# Patient Record
Sex: MALE | Race: BLACK OR AFRICAN AMERICAN | NOT HISPANIC OR LATINO | Employment: OTHER | URBAN - METROPOLITAN AREA
[De-identification: names, ages, dates, MRNs, and addresses within clinical notes are randomized per-mention and may not be internally consistent; named-entity substitution may affect disease eponyms.]

---

## 2017-03-10 ENCOUNTER — HOSPITAL ENCOUNTER (OUTPATIENT)
Dept: PAIN MEDICINE | Facility: HOSPITAL | Age: 47
Discharge: HOME | End: 2017-03-10
Attending: ANESTHESIOLOGY | Admitting: ANESTHESIOLOGY

## 2017-03-10 LAB
AMPHETAMINES UR QL SCN: NEGATIVE
BZE UR QL SCN: NEGATIVE
CREAT 24H UR-MCNC: NORMAL MG/DL
METHADONE UR QL SCN: NEGATIVE
OPIATE CONFIRMATION URINE: NORMAL
THC SERPLBLD CFM-MCNC: NEGATIVE NG/ML

## 2017-05-19 ENCOUNTER — HOSPITAL ENCOUNTER (OUTPATIENT)
Dept: PAIN MEDICINE | Facility: HOSPITAL | Age: 47
Discharge: HOME | End: 2017-05-19
Attending: ANESTHESIOLOGY | Admitting: ANESTHESIOLOGY

## 2017-07-12 ENCOUNTER — HOSPITAL ENCOUNTER (OUTPATIENT)
Dept: PAIN MEDICINE | Facility: HOSPITAL | Age: 47
Discharge: HOME | End: 2017-07-12
Attending: ANESTHESIOLOGY | Admitting: ANESTHESIOLOGY

## 2017-09-13 ENCOUNTER — HOSPITAL ENCOUNTER (OUTPATIENT)
Dept: PAIN MEDICINE | Facility: HOSPITAL | Age: 47
Discharge: HOME | End: 2017-09-13
Attending: ANESTHESIOLOGY | Admitting: ANESTHESIOLOGY

## 2018-02-28 ENCOUNTER — HOSPITAL ENCOUNTER (OUTPATIENT)
Dept: PAIN MEDICINE | Facility: HOSPITAL | Age: 48
Discharge: HOME | End: 2018-02-28
Attending: ANESTHESIOLOGY | Admitting: ANESTHESIOLOGY

## 2018-04-25 ENCOUNTER — HOSPITAL ENCOUNTER (OUTPATIENT)
Dept: PAIN MEDICINE | Facility: HOSPITAL | Age: 48
Discharge: HOME | End: 2018-04-25
Attending: ANESTHESIOLOGY | Admitting: ANESTHESIOLOGY

## 2018-06-27 ENCOUNTER — HOSPITAL ENCOUNTER (OUTPATIENT)
Dept: PAIN MEDICINE | Facility: HOSPITAL | Age: 48
Discharge: HOME | End: 2018-06-27
Attending: ANESTHESIOLOGY | Admitting: ANESTHESIOLOGY

## 2018-08-15 ENCOUNTER — HOSPITAL ENCOUNTER (OUTPATIENT)
Dept: PAIN MEDICINE | Facility: HOSPITAL | Age: 48
Discharge: HOME | End: 2018-08-15
Attending: ANESTHESIOLOGY | Admitting: ANESTHESIOLOGY

## 2018-08-15 LAB
AMPHETAMINES UR QL SCN: NEGATIVE
BARBITURATES UR QL SCN: NEGATIVE
BENZODIAZ UR QL SCN: NEGATIVE
BZE UR QL SCN: NEGATIVE
CREAT 24H UR-MCNC: NORMAL MG/DL
METHADONE UR QL SCN: NEGATIVE
OPIATE CONFIRMATION URINE: NORMAL
OPIATES TESTED UR SCN: NEGATIVE
PCP UR QL: NEGATIVE
THC SERPLBLD CFM-MCNC: NEGATIVE NG/ML

## 2018-10-10 ENCOUNTER — HOSPITAL ENCOUNTER (OUTPATIENT)
Dept: PAIN MEDICINE | Facility: HOSPITAL | Age: 48
Discharge: HOME | End: 2018-10-10
Attending: ANESTHESIOLOGY | Admitting: ANESTHESIOLOGY

## 2018-12-05 ENCOUNTER — HOSPITAL ENCOUNTER (OUTPATIENT)
Dept: PAIN MEDICINE | Facility: HOSPITAL | Age: 48
Discharge: HOME | End: 2018-12-05
Attending: ANESTHESIOLOGY | Admitting: ANESTHESIOLOGY

## 2018-12-05 LAB
AMPHETAMINES UR QL SCN: NEGATIVE
BARBITURATES UR QL SCN: NEGATIVE
BENZODIAZ UR QL SCN: NEGATIVE
BZE UR QL SCN: NEGATIVE
CREAT 24H UR-MCNC: 87.8 MG/DL
METHADONE UR QL SCN: NEGATIVE
OPIATE CONFIRMATION URINE: NORMAL
OPIATES TESTED UR SCN: NEGATIVE
PCP UR QL: NEGATIVE
THC SERPLBLD CFM-MCNC: NEGATIVE NG/ML

## 2019-01-30 ENCOUNTER — HOSPITAL ENCOUNTER (OUTPATIENT)
Dept: PAIN MEDICINE | Facility: HOSPITAL | Age: 49
Discharge: HOME | End: 2019-01-30
Attending: ANESTHESIOLOGY | Admitting: ANESTHESIOLOGY

## 2019-04-03 ENCOUNTER — HOSPITAL ENCOUNTER (OUTPATIENT)
Dept: PAIN MEDICINE | Facility: HOSPITAL | Age: 49
Discharge: HOME | End: 2019-04-03
Attending: ANESTHESIOLOGY | Admitting: ANESTHESIOLOGY

## 2019-06-03 ENCOUNTER — HOSPITAL ENCOUNTER (OUTPATIENT)
Dept: PAIN MEDICINE | Facility: HOSPITAL | Age: 49
Discharge: HOME | End: 2019-06-03
Attending: ANESTHESIOLOGY | Admitting: ANESTHESIOLOGY

## 2019-06-03 ENCOUNTER — CONVERSION ENCOUNTER (OUTPATIENT)
Dept: PAIN MEDICINE | Facility: CLINIC | Age: 49
End: 2019-06-03

## 2019-06-05 VITALS
DIASTOLIC BLOOD PRESSURE: 93 MMHG | HEIGHT: 74 IN | WEIGHT: 220 LBS | RESPIRATION RATE: 16 BRPM | BODY MASS INDEX: 28.23 KG/M2 | SYSTOLIC BLOOD PRESSURE: 174 MMHG | OXYGEN SATURATION: 97 % | HEART RATE: 82 BPM

## 2019-06-06 NOTE — PROGRESS NOTES
HPI: CC Bilateral feet/joint pain    48-year-old male with multiple comorbidities including DM, right BKA/left toes amputations,  chronic neuropathic pain, polyarthralgia here for follow-up.  Usually sees Dr. English.    Complains of continued generalized myofascial pain, peripheral neuropathy symptoms, back pain bilateral hip pain and polyarthralgia worse with activity.      Pain interfering daily activity.      Utilizes   Oxycodone with good relief functional benefit denies side effects.   could not tolerate gabapentin or Lyrica.    Referring MD: Eusebia Poon NP  Age: 48 Years Old  Sex: Male  Race: Black or     Pain Assessment   Location of Pain: R Wrist/Arm, L Wrist/Arm, R Hand, L Hand, R Hip, L Hip, R Leg, L Leg, R Knee, L Knee, R Ankle/Foot, L Ankle/Foot  Description of Pain: Sharp, Dull/Aching, Throbbing, Pins & Needles, Stabbing  Previous Pain Rating : 7  Current Pain Ratin  Aggravating Factors: Activity  Alleviating Factors: Rest, Medication  Previous Treatments: Narcotic Pain Medication, Physical Therapy  Last Dose Pain medicine Oxycodone last dose:  6-19 at 0740  Have your bowel habits changed since you started taking pain medication? No  Epidural History N/A      Past Medical History:     Reviewed history from 2016 and no changes required:        Diabetes        Neuropathy        Hypertension        hip pain        arthritis        CHF        leg edema        No Drug Allergies?     Past Surgical History:     Reviewed history from 08/15/2018 and no changes required:        Left great toe amputation        Cataract surgery        diabetic ulcers        Right foot surgery on a diabetic ulcer.         right BKA 2017        abscess removal    Family History Summary:      Reviewed history Last on 2019 and no changes required:2019  Brother - Has Family History of Diabetes - Entered On: 8/15/2018  Brother - Has Family History of Other Cancer - Entered On:  8/15/2018  Father - Has Family History of Heart Disease - Entered On: 8/15/2018  Mother - Has Family History of Diabetes - Entered On: 8/15/2018      Social History:     Reviewed history and no changes required:        Vital Signs:    Patient Profile:    48 Years Old Male  CC:         Bilateral feet/joint pain  Height:     74 inches  Weight:     220 pounds  BMI:        28.24     O2 Sat:     97 %  Temp:       97.6 degrees F oral  Pulse rate: 82 / minute  Resp:       16 per minute  BP Sittin / 93    Patient has a risk of falls? No  Patient in pain?    Yes     Location:    R Wrist/Arm, L Wrist/Arm, R Hand, L Hand, R Hip, L Hip, R Leg, L Leg, R Knee, L Knee, R Ankle/Foot, L Ankle/Foot     Intensity:   7    Problems: Active problems were reviewed with the patient during this visit.  Medications: Medications were reviewed with the patient during this visit.  Allergies: Allergies were reviewed with the patient during this visit.          Risk Factors:     Smoked Tobacco Use:  Current every day smoker     Cigarettes:  Yes -- 0.5 pack(s) per day,      Years smoked:  25    Previous Tobacco Use: Signed On - 2019  Smoked Tobacco Use:  Current every day smoker     Cigarettes:  Yes -- 1/2 pack(s) per day,      Years smoked:  25     Counseled to quit/cut down:  yes      Review of Systems        See HPI    General       Denies fever/chills, fatigue and sleep problems.    Eyes       Denies blurry vision and double vision.    ENT       Denies decreased hearing, sore throat and ears ringing.    CV       Denies chest pain and fainting.    Resp       Denies shortness of breath and cough.    GI       Denies heartburn, constipation, nausea, vomiting and diarrhea.           Denies pain on urination, incontinence and increased frequency.    MS         Back pain, shaheen leg pain. shaheen hand pain  Derm       Denies rash and itching.    Neuro       Denies numbness, tingling, loss of balance and history of seizures.    Psych        Denies anxiety and depression.    Endo       Denies weight change and thirsty all the time.    Heme       Denies easy bruising, bleeding and enlarged lymph nodes.    Physical Exam   General  General appearance: well developed, well nourished, no acute distress  Gait and station: antalgic    Mental Status Exam   Mental Status: AAO x3  Behavior: Appropriate    Cervical   ROM decreased w/ lateral rotation  Spurling's Test Negative  Palpation: Non-Tender    Thoracolumbar   ROM: decreased rotation/extension  Lg's Test: Negative  Straight Leg Raise: negative  Palpation: Tender  Facets, Paravertebral    Muscle Stretch Reflex   Sensory Decreased sensation to light touch    EXAM:     Eyes:      Clear conjunctivae,      Pupils rounds and reactive  ENT:      Clear oropharynx,       Mucosa moist/pink       Nose: no deformity  Chest Wall:      Non tender      No deformities or masses noted.    Respiratory:      Clear bilaterally to auscultation.        No dyspnea  Heart:      Regular rate and rhythm, no murmurs, rubs, or gallops   Pulses:      Pulses 2+, symmetric  Extremities:      No clubbing, cyanosis, edema, or deformity noted      R BKA with prosthesis     L all toes amputaion  Neurologic:      No focal deficits      Coordination intact with rapid alternating movement.  Skin:      Intact without lesions or rashes.  No mass  Cervical Nodes:      No adenopathy noted.      Global pain scale and PHQ-9 on chart   opioid risk tool low risk    Neuro   Reflexes: R Arm 2+  L arm 2+  R Leg 2+  L Leg 2+    Strength: Arms Normal  Strength: Legs Normal          EXAM:     Total Score Risk Category   Low Risk 0 - 3   Moderate Risk 4 - 7   High Risk > 8     Assessment   New Problems:  Myofascial pain syndrome (ICD-729.1) (TVC04-U48.1)  Polyarthralgia (ICD-719.49) (VNJ26-T05.50)  Chronic pain (ICD-338.29) (HEY13-A04.29)  Phantom limb pain (ICD-353.6) (LDF31-G78.6)  Neuropathic pain (ICD-729.2) (IEF95-D83.2)    Comments:   48-year-old  male with multiple comorbidities including DM, right BKA/left toes amputations,  chronic neuropathic pain, polyarthralgia here for follow-up.  Usually sees Dr. English.    Continue chronic pain from polyarthralgia, peripheral neuropathy, right lower extremity  phantom limb pain.      Functional benefit for oxycodone.      Continue oxycodone 15 mg 3 times daily as needed for severe pain.  UDS and inspect reviewed.  Discussed risk of tolerance, dependence, respiratory depression, coma and death associated with use of oral opioids for treatment of chronic nonmalignant pain.      RTC in 1 month with Dr. English    Plan   New Prescriptions/Refills:  OXYCODONE HCL 15 MG ORAL TABLET (OXYCODONE HCL) 1 po Q8H as needed for pain  #69 x 0,  06/03/2019, Luz Duron MD  OXYCODONE HCL 15 MG ORAL TABLET (OXYCODONE HCL) 1 po Q8H as needed for pain  #21 x 0,  06/03/2019, Luz Duron MD    Updated Medication List:   OXYCODONE HCL 15 MG ORAL TABLET (OXYCODONE HCL) 1 po Q8H as needed for pain  MOVANTIK 25 MG ORAL TABLET (NALOXEGOL OXALATE) 1 po QAM [BMN]  XARELTO TABLET (RIVAROXABAN TABS) BID  HUMALOG 100 UNIT/ML SUBCUTANEOUS SOLUTION (INSULIN LISPRO (HUMAN)) 10 units qd  PROTONIX 40 MG ORAL TABLET DELAYED RELEASE (PANTOPRAZOLE SODIUM) qd  METOPROLOL SUCCINATE ER 50 MG ORAL TABLET EXTENDED RELEASE 24 HOUR (METOPROLOL SUCCINATE) qd  LANTUS 100 UNIT/ML SUBCUTANEOUS SOLUTION (INSULIN GLARGINE) 50 units qd  HYDRALAZINE  MG ORAL TABLET (HYDRALAZINE HCL) tid    New Orders:   Ofc Vst, Est Level IV [86444]        Patient Instructions:  1)  Discussed the hazards of tobacco smoking (use). Smoking cessation recommended and techniques and options to help patient quit were discussed.  2)  Discussed importance of regular exercise and recommended starting or continuing a regular exercise program for good health.  3)  The patient was encouraged to lose weight for better health.    Medications:  OXYCODONE HCL 15 MG ORAL TABLET  (OXYCODONE HCL) 1 po Q8H as needed for pain  #69 x 0      Entered and Authorized by:  Luz Duron MD      Electronically signed by:   Luz Duron MD on 06/03/2019      Method used:    Print then Give to Patient      Note to Pharmacy: DNF before   6/10/19       RxID:   [id number]  OXYCODONE HCL 15 MG ORAL TABLET (OXYCODONE HCL) 1 po Q8H as needed for pain  #21 x 0      Entered and Authorized by:  Luz Duron MD      Electronically signed by:   Luz Duron MD on 06/03/2019      Method used:    Print then Give to Patient      RxID:   [id number]    ]      Electronically signed by Luz Duron MD on 06/04/2019 at 5:31 PM  ________________________________________________________________________       Disclaimer: Converted Note message may not contain all data elements that existed in the legacy source system. Please see [employer] [facility] for the original note details.

## 2019-07-01 ENCOUNTER — OFFICE VISIT (OUTPATIENT)
Dept: PAIN MEDICINE | Facility: CLINIC | Age: 49
End: 2019-07-01

## 2019-07-01 VITALS
SYSTOLIC BLOOD PRESSURE: 208 MMHG | TEMPERATURE: 98.2 F | BODY MASS INDEX: 28.23 KG/M2 | WEIGHT: 220 LBS | DIASTOLIC BLOOD PRESSURE: 111 MMHG | RESPIRATION RATE: 16 BRPM | HEART RATE: 92 BPM | OXYGEN SATURATION: 95 % | HEIGHT: 74 IN

## 2019-07-01 DIAGNOSIS — M79.2 NEUROPATHIC PAIN: ICD-10-CM

## 2019-07-01 DIAGNOSIS — M25.50 POLYARTHRALGIA: ICD-10-CM

## 2019-07-01 DIAGNOSIS — Z79.899 OTHER LONG TERM (CURRENT) DRUG THERAPY: ICD-10-CM

## 2019-07-01 DIAGNOSIS — G54.6 PHANTOM LIMB SYNDROME WITH PAIN (HCC): ICD-10-CM

## 2019-07-01 DIAGNOSIS — G89.4 CHRONIC PAIN SYNDROME: Primary | ICD-10-CM

## 2019-07-01 PROCEDURE — 99214 OFFICE O/P EST MOD 30 MIN: CPT | Performed by: ANESTHESIOLOGY

## 2019-07-01 PROCEDURE — G0463 HOSPITAL OUTPT CLINIC VISIT: HCPCS | Performed by: ANESTHESIOLOGY

## 2019-07-01 RX ORDER — OXYCODONE HYDROCHLORIDE 15 MG/1
15 TABLET ORAL 3 TIMES DAILY PRN
Qty: 90 TABLET | Refills: 0 | Status: SHIPPED | OUTPATIENT
Start: 2019-07-01

## 2019-07-01 RX ORDER — INSULIN GLARGINE 100 [IU]/ML
INJECTION, SOLUTION SUBCUTANEOUS
Refills: 3 | COMMUNITY
Start: 2019-06-10

## 2019-07-01 RX ORDER — BLOOD-GLUCOSE METER
KIT MISCELLANEOUS 4 TIMES DAILY
Refills: 1 | COMMUNITY
Start: 2019-06-11

## 2019-07-01 RX ORDER — ATORVASTATIN CALCIUM 10 MG/1
10 TABLET, FILM COATED ORAL NIGHTLY
Refills: 3 | COMMUNITY
Start: 2019-03-24

## 2019-07-01 RX ORDER — PANTOPRAZOLE SODIUM 40 MG/1
TABLET, DELAYED RELEASE ORAL
COMMUNITY

## 2019-07-01 RX ORDER — ASPIRIN 325 MG
325 TABLET ORAL DAILY
COMMUNITY

## 2019-07-01 RX ORDER — LISINOPRIL AND HYDROCHLOROTHIAZIDE 25; 20 MG/1; MG/1
TABLET ORAL
Refills: 0 | COMMUNITY
Start: 2019-04-23

## 2019-07-01 RX ORDER — OXYCODONE HYDROCHLORIDE 15 MG/1
TABLET ORAL
Refills: 0 | COMMUNITY
Start: 2019-06-10 | End: 2019-07-01 | Stop reason: SDUPTHER

## 2019-07-01 RX ORDER — INSULIN LISPRO 100 [IU]/ML
INJECTION, SOLUTION INTRAVENOUS; SUBCUTANEOUS
Refills: 0 | COMMUNITY
Start: 2019-04-23

## 2019-07-01 RX ORDER — HYDRALAZINE HYDROCHLORIDE 100 MG/1
100 TABLET, FILM COATED ORAL 3 TIMES DAILY
Refills: 0 | COMMUNITY
Start: 2019-04-23

## 2019-07-01 NOTE — PROGRESS NOTES
Subjective    CC leg pain, multiple joint pain  Brandon Stephen is a 48 y.o. male with multiple comorbidities including DM, right BKA/left toes amputations,  chronic neuropathic pain, polyarthralgia here for follow-up.    Used to see Dr. English.    Chronic generalized myofascial pain, peripheral neuropathy symptoms, back pain bilateral hip pain and polyarthralgia worse with activity.      Pain interfering daily activity.       Utilizes   Oxycodone with good relief functional benefit denies side effects.   could not tolerate gabapentin or Lyrica.    Pain Assessment   Location of Pain: Lower Back, R Hip, right leg,  joint  Description of Pain: Dull/Aching, Throbbing, Stabbing  Previous Pain Rating : 6  Current Pain Ratin  Aggravating Factors: Activity  Alleviating Factors: Rest, Medication    The following portions of the patient's history were reviewed and updated as appropriate: allergies, current medications, past family history, past medical history, past social history, past surgical history and problem list.    Review of Systems   Constitutional: Negative for chills, fatigue and fever.   HENT: Negative for trouble swallowing.    Respiratory: Negative.  Negative for shortness of breath.    Cardiovascular: Negative for chest pain, palpitations and leg swelling.   Gastrointestinal: Negative for nausea and vomiting.   Musculoskeletal: Positive for arthralgias, gait problem and myalgias. Negative for back pain, joint swelling, neck pain and neck stiffness.   Skin: Negative for color change, dry skin, rash and skin lesions.   Neurological: Negative for dizziness, weakness, light-headedness and headache.   Hematological: Does not bruise/bleed easily.   Psychiatric/Behavioral: Negative for behavioral problems, suicidal ideas and depressed mood.   All other systems reviewed and are negative.      Objective   Physical Exam   Constitutional: He is oriented to person, place, and time. He appears well-developed and  "well-nourished. No distress.   Eyes: Conjunctivae are normal. Pupils are equal, round, and reactive to light.   Neck: Normal range of motion and full passive range of motion without pain. No Kernig's sign noted.   Cardiovascular: Normal heart sounds and intact distal pulses.   Pulmonary/Chest: Effort normal and breath sounds normal.   Musculoskeletal:        Lumbar back: He exhibits decreased range of motion and tenderness.   Back: Paraspinal tenderness, negative leg raise    R BKA with prosthesis     L all toes amputaion         Vascular Status -  His right foot exhibits no edema. His left foot exhibits no edema.  Lymphadenopathy:     He has no cervical adenopathy.   Neurological: He is alert and oriented to person, place, and time. He has normal strength and normal reflexes. No sensory deficit. Gait abnormal. Coordination normal.   Reflex Scores:       Bicep reflexes are 2+ on the right side and 2+ on the left side.       Brachioradialis reflexes are 2+ on the right side and 2+ on the left side.       Patellar reflexes are 2+ on the right side and 2+ on the left side.       Achilles reflexes are 2+ on the right side and 2+ on the left side.  Antalgic gait   Skin: Skin is warm and dry. Capillary refill takes less than 2 seconds.   Psychiatric: He has a normal mood and affect. His behavior is normal.   Vitals reviewed.    BP (!) 208/111   Pulse 92   Temp 98.2 °F (36.8 °C)   Resp 16   Ht 188 cm (74\")   Wt 99.8 kg (220 lb)   SpO2 95%   BMI 28.25 kg/m²     Global pain scale and PHQ 9 on chart  Opioid risk tool low risk    Assessment/Plan   Brandon was seen today for leg pain, joint pain and oxycodone.    Diagnoses and all orders for this visit:    Chronic pain syndrome    Polyarthralgia    Neuropathic pain    Phantom limb syndrome with pain (CMS/HCC)    Other long term (current) drug therapy    Other orders  -     oxyCODONE (ROXICODONE) 15 MG immediate release tablet; Take 1 tablet by mouth 3 (Three) Times a Day " As Needed for Moderate Pain  or Severe Pain .        Summary  48-year-old male with multiple comorbidities including DM, right BKA/left toes amputations,  chronic neuropathic pain, polyarthralgia here for follow-up.      Chronic pain from polyarthralgia, peripheral neuropathy, right lower extremity  phantom limb pain.        Continue oxycodone 15 mg 3 times daily as needed for severe pain.  UDS and inspect reviewed.  Discussed risk of tolerance, dependence, respiratory depression, coma and death associated with use of oral opioids for treatment of chronic nonmalignant pain.       RTC in 1 month

## 2023-11-08 ENCOUNTER — TRANSCRIBE ORDERS (OUTPATIENT)
Dept: ADMINISTRATIVE | Facility: HOSPITAL | Age: 53
End: 2023-11-08
Payer: MEDICAID

## 2023-11-08 ENCOUNTER — HOSPITAL ENCOUNTER (OUTPATIENT)
Dept: GENERAL RADIOLOGY | Facility: HOSPITAL | Age: 53
Discharge: HOME | End: 2023-11-08
Admitting: NURSE PRACTITIONER
Payer: MEDICAID

## 2023-11-08 DIAGNOSIS — M54.50 LOW BACK PAIN, UNSPECIFIED BACK PAIN LATERALITY, UNSPECIFIED CHRONICITY, UNSPECIFIED WHETHER SCIATICA PRESENT: Primary | ICD-10-CM

## 2023-11-08 DIAGNOSIS — M54.50 LOW BACK PAIN, UNSPECIFIED BACK PAIN LATERALITY, UNSPECIFIED CHRONICITY, UNSPECIFIED WHETHER SCIATICA PRESENT: ICD-10-CM

## 2023-11-08 PROCEDURE — 72110 X-RAY EXAM L-2 SPINE 4/>VWS: CPT

## 2025-04-02 DIAGNOSIS — N18.9 ANEMIA IN CHRONIC KIDNEY DISEASE, UNSPECIFIED CKD STAGE: Primary | ICD-10-CM

## 2025-04-02 DIAGNOSIS — D63.1 ANEMIA IN CHRONIC KIDNEY DISEASE, UNSPECIFIED CKD STAGE: Primary | ICD-10-CM

## 2025-04-03 ENCOUNTER — HOSPITAL ENCOUNTER (OUTPATIENT)
Dept: INFUSION THERAPY | Facility: HOSPITAL | Age: 55
Discharge: HOME | End: 2025-04-03
Payer: MEDICAID

## 2025-04-03 VITALS
OXYGEN SATURATION: 98 % | DIASTOLIC BLOOD PRESSURE: 99 MMHG | RESPIRATION RATE: 16 BRPM | TEMPERATURE: 98.2 F | HEART RATE: 84 BPM | SYSTOLIC BLOOD PRESSURE: 169 MMHG

## 2025-04-03 DIAGNOSIS — D63.1 ANEMIA IN CHRONIC KIDNEY DISEASE, UNSPECIFIED CKD STAGE: ICD-10-CM

## 2025-04-03 DIAGNOSIS — N18.9 ANEMIA IN CHRONIC KIDNEY DISEASE, UNSPECIFIED CKD STAGE: Primary | ICD-10-CM

## 2025-04-03 DIAGNOSIS — D63.1 ANEMIA IN CHRONIC KIDNEY DISEASE, UNSPECIFIED CKD STAGE: Primary | ICD-10-CM

## 2025-04-03 DIAGNOSIS — N18.9 ANEMIA IN CHRONIC KIDNEY DISEASE, UNSPECIFIED CKD STAGE: ICD-10-CM

## 2025-04-03 LAB
ABO GROUP BLD: NORMAL
BB HOLD TUBE: NORMAL
BLD GP AB SCN SERPL QL: NEGATIVE
HCT VFR BLD AUTO: 21.1 % (ref 37.5–51)
HGB BLD-MCNC: 6.5 G/DL (ref 13–17.7)
RH BLD: POSITIVE
T&S EXPIRATION DATE: NORMAL

## 2025-04-03 PROCEDURE — 36415 COLL VENOUS BLD VENIPUNCTURE: CPT

## 2025-04-03 PROCEDURE — 86900 BLOOD TYPING SEROLOGIC ABO: CPT

## 2025-04-03 PROCEDURE — 36430 TRANSFUSION BLD/BLD COMPNT: CPT

## 2025-04-03 PROCEDURE — P9016 RBC LEUKOCYTES REDUCED: HCPCS

## 2025-04-03 PROCEDURE — 86901 BLOOD TYPING SEROLOGIC RH(D): CPT

## 2025-04-03 PROCEDURE — 86923 COMPATIBILITY TEST ELECTRIC: CPT

## 2025-04-03 PROCEDURE — 85014 HEMATOCRIT: CPT | Performed by: INTERNAL MEDICINE

## 2025-04-03 PROCEDURE — 86850 RBC ANTIBODY SCREEN: CPT | Performed by: INTERNAL MEDICINE

## 2025-04-03 PROCEDURE — 86900 BLOOD TYPING SEROLOGIC ABO: CPT | Performed by: INTERNAL MEDICINE

## 2025-04-03 PROCEDURE — 86901 BLOOD TYPING SEROLOGIC RH(D): CPT | Performed by: INTERNAL MEDICINE

## 2025-04-03 PROCEDURE — 85018 HEMOGLOBIN: CPT | Performed by: INTERNAL MEDICINE

## 2025-04-03 RX ORDER — SODIUM CHLORIDE 9 MG/ML
250 INJECTION, SOLUTION INTRAVENOUS ONCE
Status: DISCONTINUED | OUTPATIENT
Start: 2025-04-03 | End: 2025-04-05 | Stop reason: HOSPADM

## 2025-04-03 RX ORDER — SODIUM CHLORIDE 9 MG/ML
250 INJECTION, SOLUTION INTRAVENOUS ONCE
Status: CANCELLED | OUTPATIENT
Start: 2025-04-03

## 2025-04-04 LAB
BH BB BLOOD EXPIRATION DATE: NORMAL
BH BB BLOOD TYPE BARCODE: 5100
BH BB DISPENSE STATUS: NORMAL
BH BB PRODUCT CODE: NORMAL
BH BB UNIT NUMBER: NORMAL
CROSSMATCH INTERPRETATION: NORMAL
UNIT  ABO: NORMAL
UNIT  RH: NORMAL

## 2025-04-22 ENCOUNTER — APPOINTMENT (OUTPATIENT)
Dept: GENERAL RADIOLOGY | Facility: HOSPITAL | Age: 55
End: 2025-04-22
Payer: MEDICAID

## 2025-04-22 ENCOUNTER — HOSPITAL ENCOUNTER (OUTPATIENT)
Facility: HOSPITAL | Age: 55
Setting detail: OBSERVATION
Discharge: HOME | End: 2025-04-24
Attending: STUDENT IN AN ORGANIZED HEALTH CARE EDUCATION/TRAINING PROGRAM | Admitting: STUDENT IN AN ORGANIZED HEALTH CARE EDUCATION/TRAINING PROGRAM
Payer: MEDICAID

## 2025-04-22 DIAGNOSIS — R09.02 HYPOXIA: ICD-10-CM

## 2025-04-22 DIAGNOSIS — R06.09 DYSPNEA ON EXERTION: Primary | ICD-10-CM

## 2025-04-22 PROBLEM — J96.01 ACUTE HYPOXIC RESPIRATORY FAILURE: Status: ACTIVE | Noted: 2025-04-22

## 2025-04-22 LAB
ANION GAP SERPL CALCULATED.3IONS-SCNC: 15.4 MMOL/L (ref 5–15)
ANISOCYTOSIS BLD QL: NORMAL
B PARAPERT DNA SPEC QL NAA+PROBE: NOT DETECTED
B PERT DNA SPEC QL NAA+PROBE: NOT DETECTED
BASOPHILS # BLD AUTO: 0.04 10*3/MM3 (ref 0–0.2)
BASOPHILS NFR BLD AUTO: 0.6 % (ref 0–1.5)
BUN SERPL-MCNC: 56 MG/DL (ref 6–20)
BUN/CREAT SERPL: 6.3 (ref 7–25)
C PNEUM DNA NPH QL NAA+NON-PROBE: NOT DETECTED
CALCIUM SPEC-SCNC: 7.2 MG/DL (ref 8.6–10.5)
CHLORIDE SERPL-SCNC: 106 MMOL/L (ref 98–107)
CO2 SERPL-SCNC: 21.6 MMOL/L (ref 22–29)
CREAT SERPL-MCNC: 8.92 MG/DL (ref 0.76–1.27)
DEPRECATED RDW RBC AUTO: 66.4 FL (ref 37–54)
EGFRCR SERPLBLD CKD-EPI 2021: 6.5 ML/MIN/1.73
EOSINOPHIL # BLD AUTO: 0.42 10*3/MM3 (ref 0–0.4)
EOSINOPHIL NFR BLD AUTO: 6.7 % (ref 0.3–6.2)
ERYTHROCYTE [DISTWIDTH] IN BLOOD BY AUTOMATED COUNT: 18.2 % (ref 12.3–15.4)
FLUAV SUBTYP SPEC NAA+PROBE: NOT DETECTED
FLUBV RNA ISLT QL NAA+PROBE: NOT DETECTED
GEN 5 1HR TROPONIN T REFLEX: 113 NG/L
GIANT PLATELETS: NORMAL
GLUCOSE BLDC GLUCOMTR-MCNC: 100 MG/DL (ref 70–105)
GLUCOSE BLDC GLUCOMTR-MCNC: 72 MG/DL (ref 70–105)
GLUCOSE SERPL-MCNC: 215 MG/DL (ref 65–99)
HADV DNA SPEC NAA+PROBE: NOT DETECTED
HBA1C MFR BLD: 5.56 % (ref 4.8–5.6)
HCOV 229E RNA SPEC QL NAA+PROBE: NOT DETECTED
HCOV HKU1 RNA SPEC QL NAA+PROBE: NOT DETECTED
HCOV NL63 RNA SPEC QL NAA+PROBE: NOT DETECTED
HCOV OC43 RNA SPEC QL NAA+PROBE: NOT DETECTED
HCT VFR BLD AUTO: 26.6 % (ref 37.5–51)
HGB BLD-MCNC: 8.1 G/DL (ref 13–17.7)
HMPV RNA NPH QL NAA+NON-PROBE: NOT DETECTED
HOLD SPECIMEN: NORMAL
HPIV1 RNA ISLT QL NAA+PROBE: NOT DETECTED
HPIV2 RNA SPEC QL NAA+PROBE: NOT DETECTED
HPIV3 RNA NPH QL NAA+PROBE: NOT DETECTED
HPIV4 P GENE NPH QL NAA+PROBE: NOT DETECTED
HYPOCHROMIA BLD QL: NORMAL
IMM GRANULOCYTES # BLD AUTO: 0.09 10*3/MM3 (ref 0–0.05)
IMM GRANULOCYTES NFR BLD AUTO: 1.4 % (ref 0–0.5)
LYMPHOCYTES # BLD AUTO: 1.55 10*3/MM3 (ref 0.7–3.1)
LYMPHOCYTES NFR BLD AUTO: 24.8 % (ref 19.6–45.3)
M PNEUMO IGG SER IA-ACNC: NOT DETECTED
MCH RBC QN AUTO: 30.3 PG (ref 26.6–33)
MCHC RBC AUTO-ENTMCNC: 30.5 G/DL (ref 31.5–35.7)
MCV RBC AUTO: 99.6 FL (ref 79–97)
MONOCYTES # BLD AUTO: 0.57 10*3/MM3 (ref 0.1–0.9)
MONOCYTES NFR BLD AUTO: 9.1 % (ref 5–12)
NEUTROPHILS NFR BLD AUTO: 3.58 10*3/MM3 (ref 1.7–7)
NEUTROPHILS NFR BLD AUTO: 57.4 % (ref 42.7–76)
NRBC BLD AUTO-RTO: 0 /100 WBC (ref 0–0.2)
NT-PROBNP SERPL-MCNC: ABNORMAL PG/ML (ref 0–900)
PLATELET # BLD AUTO: 230 10*3/MM3 (ref 140–450)
PMV BLD AUTO: 11 FL (ref 6–12)
POTASSIUM SERPL-SCNC: 4.5 MMOL/L (ref 3.5–5.2)
RBC # BLD AUTO: 2.67 10*6/MM3 (ref 4.14–5.8)
RHINOVIRUS RNA SPEC NAA+PROBE: NOT DETECTED
RSV RNA NPH QL NAA+NON-PROBE: NOT DETECTED
SARS-COV-2 RNA RESP QL NAA+PROBE: NOT DETECTED
SODIUM SERPL-SCNC: 143 MMOL/L (ref 136–145)
TROPONIN T % DELTA: 0
TROPONIN T NUMERIC DELTA: 0 NG/L
TROPONIN T SERPL HS-MCNC: 113 NG/L
WBC MORPH BLD: NORMAL
WBC NRBC COR # BLD AUTO: 6.25 10*3/MM3 (ref 3.4–10.8)

## 2025-04-22 PROCEDURE — 36415 COLL VENOUS BLD VENIPUNCTURE: CPT

## 2025-04-22 PROCEDURE — 0202U NFCT DS 22 TRGT SARS-COV-2: CPT | Performed by: NURSE PRACTITIONER

## 2025-04-22 PROCEDURE — 99285 EMERGENCY DEPT VISIT HI MDM: CPT

## 2025-04-22 PROCEDURE — G0378 HOSPITAL OBSERVATION PER HR: HCPCS

## 2025-04-22 PROCEDURE — 74022 RADEX COMPL AQT ABD SERIES: CPT

## 2025-04-22 PROCEDURE — 96374 THER/PROPH/DIAG INJ IV PUSH: CPT

## 2025-04-22 PROCEDURE — 25010000002 HEPARIN (PORCINE) PER 1000 UNITS: Performed by: STUDENT IN AN ORGANIZED HEALTH CARE EDUCATION/TRAINING PROGRAM

## 2025-04-22 PROCEDURE — 82948 REAGENT STRIP/BLOOD GLUCOSE: CPT

## 2025-04-22 PROCEDURE — 83880 ASSAY OF NATRIURETIC PEPTIDE: CPT | Performed by: NURSE PRACTITIONER

## 2025-04-22 PROCEDURE — 84484 ASSAY OF TROPONIN QUANT: CPT | Performed by: NURSE PRACTITIONER

## 2025-04-22 PROCEDURE — 96372 THER/PROPH/DIAG INJ SC/IM: CPT

## 2025-04-22 PROCEDURE — 80048 BASIC METABOLIC PNL TOTAL CA: CPT | Performed by: NURSE PRACTITIONER

## 2025-04-22 PROCEDURE — 25010000002 LABETALOL 5 MG/ML SOLUTION

## 2025-04-22 PROCEDURE — 93005 ELECTROCARDIOGRAM TRACING: CPT | Performed by: NURSE PRACTITIONER

## 2025-04-22 PROCEDURE — 85025 COMPLETE CBC W/AUTO DIFF WBC: CPT | Performed by: NURSE PRACTITIONER

## 2025-04-22 PROCEDURE — 83036 HEMOGLOBIN GLYCOSYLATED A1C: CPT | Performed by: STUDENT IN AN ORGANIZED HEALTH CARE EDUCATION/TRAINING PROGRAM

## 2025-04-22 PROCEDURE — 25010000002 BUMETANIDE PER 0.5 MG: Performed by: STUDENT IN AN ORGANIZED HEALTH CARE EDUCATION/TRAINING PROGRAM

## 2025-04-22 PROCEDURE — 85007 BL SMEAR W/DIFF WBC COUNT: CPT | Performed by: NURSE PRACTITIONER

## 2025-04-22 PROCEDURE — 96375 TX/PRO/DX INJ NEW DRUG ADDON: CPT

## 2025-04-22 RX ORDER — AMOXICILLIN 250 MG
2 CAPSULE ORAL 2 TIMES DAILY
Status: DISCONTINUED | OUTPATIENT
Start: 2025-04-22 | End: 2025-04-24 | Stop reason: HOSPADM

## 2025-04-22 RX ORDER — POLYETHYLENE GLYCOL 3350 17 G/17G
17 POWDER, FOR SOLUTION ORAL DAILY
Status: DISCONTINUED | OUTPATIENT
Start: 2025-04-22 | End: 2025-04-24 | Stop reason: HOSPADM

## 2025-04-22 RX ORDER — BISACODYL 10 MG
10 SUPPOSITORY, RECTAL RECTAL DAILY PRN
Status: DISCONTINUED | OUTPATIENT
Start: 2025-04-22 | End: 2025-04-24 | Stop reason: HOSPADM

## 2025-04-22 RX ORDER — LABETALOL HYDROCHLORIDE 5 MG/ML
10 INJECTION, SOLUTION INTRAVENOUS ONCE
Status: COMPLETED | OUTPATIENT
Start: 2025-04-23 | End: 2025-04-22

## 2025-04-22 RX ORDER — IBUPROFEN 600 MG/1
1 TABLET ORAL
Status: DISCONTINUED | OUTPATIENT
Start: 2025-04-22 | End: 2025-04-24 | Stop reason: HOSPADM

## 2025-04-22 RX ORDER — NITROGLYCERIN 0.4 MG/1
0.4 TABLET SUBLINGUAL
Status: DISCONTINUED | OUTPATIENT
Start: 2025-04-22 | End: 2025-04-24 | Stop reason: HOSPADM

## 2025-04-22 RX ORDER — LIDOCAINE 50 MG/G
1 PATCH TOPICAL EVERY 12 HOURS
COMMUNITY

## 2025-04-22 RX ORDER — HYDRALAZINE HYDROCHLORIDE 25 MG/1
75 TABLET, FILM COATED ORAL ONCE
Status: COMPLETED | OUTPATIENT
Start: 2025-04-22 | End: 2025-04-22

## 2025-04-22 RX ORDER — OXYCODONE HYDROCHLORIDE 10 MG/1
1 TABLET, COATED ORAL 3 TIMES DAILY PRN
COMMUNITY

## 2025-04-22 RX ORDER — ACETAMINOPHEN 160 MG/5ML
650 SOLUTION ORAL EVERY 4 HOURS PRN
Status: DISCONTINUED | OUTPATIENT
Start: 2025-04-22 | End: 2025-04-24 | Stop reason: HOSPADM

## 2025-04-22 RX ORDER — SODIUM CHLORIDE 0.9 % (FLUSH) 0.9 %
10 SYRINGE (ML) INJECTION AS NEEDED
Status: DISCONTINUED | OUTPATIENT
Start: 2025-04-22 | End: 2025-04-24 | Stop reason: HOSPADM

## 2025-04-22 RX ORDER — CYCLOBENZAPRINE HCL 10 MG
10 TABLET ORAL DAILY PRN
COMMUNITY

## 2025-04-22 RX ORDER — HYDRALAZINE HYDROCHLORIDE 25 MG/1
25 TABLET, FILM COATED ORAL EVERY 8 HOURS SCHEDULED
Status: COMPLETED | OUTPATIENT
Start: 2025-04-22 | End: 2025-04-22

## 2025-04-22 RX ORDER — CYCLOBENZAPRINE HCL 10 MG
10 TABLET ORAL DAILY PRN
Status: DISCONTINUED | OUTPATIENT
Start: 2025-04-22 | End: 2025-04-24 | Stop reason: HOSPADM

## 2025-04-22 RX ORDER — CINACALCET 30 MG/1
60 TABLET, FILM COATED ORAL EVERY OTHER DAY
Status: DISCONTINUED | OUTPATIENT
Start: 2025-04-23 | End: 2025-04-24 | Stop reason: HOSPADM

## 2025-04-22 RX ORDER — HEPARIN SODIUM 5000 [USP'U]/ML
5000 INJECTION, SOLUTION INTRAVENOUS; SUBCUTANEOUS EVERY 8 HOURS SCHEDULED
Status: DISCONTINUED | OUTPATIENT
Start: 2025-04-22 | End: 2025-04-24 | Stop reason: HOSPADM

## 2025-04-22 RX ORDER — INSULIN LISPRO 100 [IU]/ML
2-9 INJECTION, SOLUTION INTRAVENOUS; SUBCUTANEOUS
Status: DISCONTINUED | OUTPATIENT
Start: 2025-04-22 | End: 2025-04-24 | Stop reason: HOSPADM

## 2025-04-22 RX ORDER — HYDRALAZINE HYDROCHLORIDE 25 MG/1
100 TABLET, FILM COATED ORAL 3 TIMES DAILY
Status: DISCONTINUED | OUTPATIENT
Start: 2025-04-23 | End: 2025-04-24 | Stop reason: HOSPADM

## 2025-04-22 RX ORDER — BUMETANIDE 0.25 MG/ML
1 INJECTION, SOLUTION INTRAMUSCULAR; INTRAVENOUS EVERY 12 HOURS
Status: DISCONTINUED | OUTPATIENT
Start: 2025-04-22 | End: 2025-04-24 | Stop reason: HOSPADM

## 2025-04-22 RX ORDER — BISACODYL 5 MG/1
5 TABLET, DELAYED RELEASE ORAL DAILY
Status: DISCONTINUED | OUTPATIENT
Start: 2025-04-22 | End: 2025-04-24 | Stop reason: HOSPADM

## 2025-04-22 RX ORDER — DEXTROSE MONOHYDRATE 25 G/50ML
25 INJECTION, SOLUTION INTRAVENOUS
Status: DISCONTINUED | OUTPATIENT
Start: 2025-04-22 | End: 2025-04-24 | Stop reason: HOSPADM

## 2025-04-22 RX ORDER — CINACALCET 60 MG/1
60 TABLET, FILM COATED ORAL EVERY OTHER DAY
COMMUNITY

## 2025-04-22 RX ORDER — ACETAMINOPHEN 325 MG/1
650 TABLET ORAL EVERY 4 HOURS PRN
Status: DISCONTINUED | OUTPATIENT
Start: 2025-04-22 | End: 2025-04-24 | Stop reason: HOSPADM

## 2025-04-22 RX ORDER — ASPIRIN 325 MG
325 TABLET ORAL DAILY
Status: DISCONTINUED | OUTPATIENT
Start: 2025-04-22 | End: 2025-04-24 | Stop reason: HOSPADM

## 2025-04-22 RX ORDER — NICOTINE POLACRILEX 4 MG
15 LOZENGE BUCCAL
Status: DISCONTINUED | OUTPATIENT
Start: 2025-04-22 | End: 2025-04-24 | Stop reason: HOSPADM

## 2025-04-22 RX ORDER — OXYCODONE HYDROCHLORIDE 5 MG/1
10 TABLET ORAL EVERY 8 HOURS PRN
Refills: 0 | Status: DISCONTINUED | OUTPATIENT
Start: 2025-04-22 | End: 2025-04-24 | Stop reason: HOSPADM

## 2025-04-22 RX ORDER — ACETAMINOPHEN 650 MG/1
650 SUPPOSITORY RECTAL EVERY 4 HOURS PRN
Status: DISCONTINUED | OUTPATIENT
Start: 2025-04-22 | End: 2025-04-24 | Stop reason: HOSPADM

## 2025-04-22 RX ORDER — NALOXONE HCL 0.4 MG/ML
0.4 VIAL (ML) INJECTION
Status: DISCONTINUED | OUTPATIENT
Start: 2025-04-22 | End: 2025-04-24 | Stop reason: HOSPADM

## 2025-04-22 RX ADMIN — HYDRALAZINE HYDROCHLORIDE 75 MG: 25 TABLET ORAL at 18:56

## 2025-04-22 RX ADMIN — LABETALOL HYDROCHLORIDE 10 MG: 5 INJECTION, SOLUTION INTRAVENOUS at 23:25

## 2025-04-22 RX ADMIN — BUMETANIDE 1 MG: 0.25 INJECTION INTRAMUSCULAR; INTRAVENOUS at 17:14

## 2025-04-22 RX ADMIN — HEPARIN SODIUM 5000 UNITS: 5000 INJECTION INTRAVENOUS; SUBCUTANEOUS at 17:14

## 2025-04-22 RX ADMIN — NALOXEGOL OXALATE 25 MG: 25 TABLET, FILM COATED ORAL at 17:13

## 2025-04-22 RX ADMIN — HYDRALAZINE HYDROCHLORIDE 25 MG: 25 TABLET ORAL at 21:31

## 2025-04-22 RX ADMIN — HYDRALAZINE HYDROCHLORIDE 25 MG: 25 TABLET ORAL at 17:15

## 2025-04-22 RX ADMIN — ASPIRIN 325 MG ORAL TABLET 325 MG: 325 PILL ORAL at 18:57

## 2025-04-22 RX ADMIN — POLYETHYLENE GLYCOL 3350 17 G: 17 POWDER, FOR SOLUTION ORAL at 17:14

## 2025-04-22 RX ADMIN — BISACODYL 5 MG: 5 TABLET, COATED ORAL at 17:13

## 2025-04-22 NOTE — H&P
"[facility] Medicine Services  History & Physical    Patient Name: Brandon Stephen  : 1970  MRN: [id number]  Primary Care Physician:  Eusebia Poon APRN  Date of admission: 2025  Date and Time of Service: 2025 at 545    Subjective      Chief Complaint: Constipation, SOA    History of Present Illness: Brandon Stephen is a 54 y.o. male with a CMH of ESRD on PD, T2DM, ACD, CHF, htn, chronic pain who presented to [facility] on 2025 with constipation and SOA. Pt initially presented with complaint of constipation, reports he has not had a bowel movement in 4 days. Denies abd pain but states it is uncomfortable. He is on opioids for chronic pain and has had similar constipation previously. In ED he was found to be hypoxic to 86%, started on 2L O2 with improvement. Noted to be fluid overloaded on exam and pt did report SOA/RINCON for the past 3 days or so with incr LE edema. Denies any chest pain/pressure/fevers/chills/sore throat. He states this happens intermittently where he gets fluid overloaded. He has a diuretic at home but does not use it regularly, states \"I guess I was too late this time\". He does peritoneal dialysis at home, does still make urine, denies any issues with his PD. Otherwise reports compliance with his medications. Nephrology consulted in ED for assistance with dialysis/volume overload.      Review of Systems   Constitutional:  Negative for activity change, appetite change, chills and fever.   HENT:  Negative for congestion, rhinorrhea and sore throat.    Eyes: Negative.    Respiratory:  Positive for shortness of breath. Negative for cough and wheezing.    Cardiovascular:  Positive for leg swelling. Negative for chest pain and palpitations.   Gastrointestinal:  Positive for constipation. Negative for abdominal distention, abdominal pain, blood in stool, diarrhea, nausea and vomiting.   Genitourinary:  Negative for dysuria, frequency, hematuria and urgency. "   Musculoskeletal:  Positive for arthralgias. Negative for myalgias.        S/p R AKA   Neurological:  Negative for dizziness, syncope, light-headedness and headaches.       Personal History     Past Medical History:   Diagnosis Date    Arthritis     CHF (congestive heart failure)     Diabetes mellitus     Diabetic skin ulcer     Hip pain     Hypertension     Kidney disease     Leg edema     Leg pain, bilateral     Neuropathy        Past Surgical History:   Procedure Laterality Date    AMPUTATION FOOT / TOE Left     Left great toe-Abstracted from [facility]    BELOW KNEE LEG AMPUTATION Right 11/2017    CATARACT EXTRACTION      Cataract  surgery-Abstracted from [facility]    FOOT SURGERY Right     Right foot surgery on diabetic ulcer-abstracted from [facility]    OTHER SURGICAL HISTORY      Abscess removal-Abstracted from [facility]       Family History: family history includes Diabetes in his brother and mother; Heart disease in his father; Other in his brother. Otherwise pertinent FHx was reviewed and not pertinent to current issue.    Social History:  reports that he has quit smoking. His smoking use included cigarettes. He has never used smokeless tobacco. Drug use questions deferred to the physician. He reports that he does not drink alcohol.    Home Medications:  Prior to Admission Medications       Prescriptions Last Dose Informant Patient Reported? Taking?    aspirin 325 MG tablet   Yes No    Take 1 tablet by mouth Daily.    atorvastatin (LIPITOR) 10 MG tablet   Yes No    Take 10 mg by mouth Every Night.    Patient not taking:  Reported on 4/3/2025    FREESTYLE LITE test strip   Yes No    4 (Four) Times a Day.    Patient not taking:  Reported on 4/3/2025    HUMALOG KWIKPEN 100 UNIT/ML solution pen-injector   Yes No    INJECT TEN (10) TO 15 UNITS INTO THE SKIN THREE TIMES DAILY WITH MEALS    hydrALAZINE (APRESOLINE) 100 MG tablet   Yes No    Take 1 tablet by mouth 3 (Three) Times a Day.    LANTUS SOLOSTAR 100 UNIT/ML injection  pen   Yes No    INJECT 52 UNITS INTO THE SKIN EVERY NIGHT AT BEDTIME    lisinopril-hydrochlorothiazide (PRINZIDE,ZESTORETIC) 20-25 MG per tablet   Yes No    TAKE ONE (1) TABLET BY MOUTH EVERY NIGHT AT BEDTIME    Patient not taking:  Reported on 4/3/2025    Naloxegol Oxalate (MOVANTIK) 25 MG tablet   Yes No    MOVANTIK 25 MG TABS    Patient not taking:  Reported on 4/3/2025    oxyCODONE (ROXICODONE) 15 MG immediate release tablet   No No    Take 1 tablet by mouth 3 (Three) Times a Day As Needed for Moderate Pain  or Severe Pain .    pantoprazole (PROTONIX) 40 MG EC tablet   Yes No    PROTONIX 40 MG TBEC    Patient not taking:  Reported on 4/3/2025              Allergies:  No Known Allergies    Objective      Vitals:   Temp:  [98.6 °F (37 °C)] 98.6 °F (37 °C)  Heart Rate:  [101-114] 101  Resp:  [18] 18  BP: (177-195)/(106-118) 177/106  Body mass index is 28.67 kg/m².  Physical Exam  Constitutional:       General: He is not in acute distress.     Appearance: Normal appearance.   HENT:      Head: Normocephalic and atraumatic.      Mouth/Throat:      Mouth: Mucous membranes are moist.      Pharynx: Oropharynx is clear.   Eyes:      Extraocular Movements: Extraocular movements intact.      Conjunctiva/sclera: Conjunctivae normal.   Cardiovascular:      Rate and Rhythm: Regular rhythm. Tachycardia present.      Heart sounds: Normal heart sounds.   Pulmonary:      Effort: Pulmonary effort is normal.      Breath sounds: No wheezing.      Comments: + bibasilar crackles  No incr wob on 2L  Abdominal:      General: Abdomen is flat. Bowel sounds are normal.      Palpations: Abdomen is soft.      Tenderness: There is no abdominal tenderness. There is no guarding or rebound.      Comments: PD catheter in place with dressing, no surrounding erythema/induration/drainage noted   Musculoskeletal:         General: No swelling or tenderness.      Cervical back: Normal range of motion and neck supple.      Right lower leg: Edema present.       Left lower leg: Edema present.      Comments: S/p R AKA, L metatarsal amputation   Skin:     General: Skin is warm and dry.   Neurological:      General: No focal deficit present.      Mental Status: He is alert and oriented to person, place, and time. Mental status is at baseline.         Diagnostic Data:  Lab Results (last 24 hours)       Procedure Component Value Units Date/Time    High Sensitivity Troponin T 1Hr [[id number]]  (Abnormal) Collected: 04/22/25 1418    Specimen: Blood Updated: 04/22/25 1440     HS Troponin T 113 ng/L      Comment: Specimen hemolyzed.  Results may be falsely decreased.        Troponin T Numeric Delta 0 ng/L      Troponin T % Delta 0    Narrative:      High Sensitive Troponin T Reference Range:  <14.0 ng/L- Negative Female for AMI  <22.0 ng/L- Negative Male for AMI  >=14 - Abnormal Female indicating possible myocardial injury.  >=22 - Abnormal Male indicating possible myocardial injury.   Clinicians would have to utilize clinical acumen, EKG, Troponin, and serial changes to determine if it is an Acute Myocardial Infarction or myocardial injury due to an underlying chronic condition.         Basic Metabolic Panel [[id number]]  (Abnormal) Collected: 04/22/25 1318    Specimen: Blood Updated: 04/22/25 1420     Glucose 215 mg/dL      BUN 56 mg/dL      Creatinine 8.92 mg/dL      Sodium 143 mmol/L      Potassium 4.5 mmol/L      Comment: Specimen hemolyzed.  Result may be falsely elevated.        Chloride 106 mmol/L      CO2 21.6 mmol/L      Calcium 7.2 mg/dL      BUN/Creatinine Ratio 6.3     Anion Gap 15.4 mmol/L      eGFR 6.5 mL/min/1.73     Narrative:      GFR Categories in Chronic Kidney Disease (CKD)      GFR Category          GFR (mL/min/1.73)    Interpretation  G1                     90 or greater         Normal or high (1)  G2                      60-89                Mild decrease (1)  G3a                   45-59                Mild to moderate decrease  G3b                   30-44                 Moderate to severe decrease  G4                    15-29                Severe decrease  G5                    14 or less           Kidney failure          (1)In the absence of evidence of kidney disease, neither GFR category G1 or G2 fulfill the criteria for CKD.    eGFR calculation 2021 CKD-EPI creatinine equation, which does not include race as a factor    High Sensitivity Troponin T [[id number]]  (Abnormal) Collected: 04/22/25 1318    Specimen: Blood Updated: 04/22/25 1417     HS Troponin T 113 ng/L     Narrative:      High Sensitive Troponin T Reference Range:  <14.0 ng/L- Negative Female for AMI  <22.0 ng/L- Negative Male for AMI  >=14 - Abnormal Female indicating possible myocardial injury.  >=22 - Abnormal Male indicating possible myocardial injury.   Clinicians would have to utilize clinical acumen, EKG, Troponin, and serial changes to determine if it is an Acute Myocardial Infarction or myocardial injury due to an underlying chronic condition.         Extra Tubes [[id number]] Collected: 04/22/25 1318    Specimen: Blood Updated: 04/22/25 1415    Narrative:      The following orders were created for panel order Extra Tubes.  Procedure                               Abnormality         Status                     ---------                               -----------         ------                     Green Top (Gel)[[id number]]                                  Final result                 Please view results for these tests on the individual orders.    Green Top (Gel) [[id number]] Collected: 04/22/25 1318    Specimen: Blood Updated: 04/22/25 1415     Extra Tube Hold for add-ons.     Comment: Auto resulted.       Respiratory Panel PCR w/COVID-19(SARS-CoV-2) ILIANA/LEONARDO/MYRNA/PAD/COR/LAYA In-House, NP Swab in [facility], 2 HR TAT - Swab, Nasopharynx [[id number]]  (Normal) Collected: 04/22/25 1319    Specimen: Swab from Nasopharynx Updated: 04/22/25 1411     ADENOVIRUS, PCR Not Detected     Coronavirus 229E Not  Detected     Coronavirus HKU1 Not Detected     Coronavirus NL63 Not Detected     Coronavirus OC43 Not Detected     COVID19 Not Detected     Human Metapneumovirus Not Detected     Human Rhinovirus/Enterovirus Not Detected     Influenza A PCR Not Detected     Influenza B PCR Not Detected     Parainfluenza Virus 1 Not Detected     Parainfluenza Virus 2 Not Detected     Parainfluenza Virus 3 Not Detected     Parainfluenza Virus 4 Not Detected     RSV, PCR Not Detected     Bordetella pertussis pcr Not Detected     Bordetella parapertussis PCR Not Detected     Chlamydophila pneumoniae PCR Not Detected     Mycoplasma pneumo by PCR Not Detected    Narrative:      In the setting of a positive respiratory panel with a viral infection PLUS a negative procalcitonin without other underlying concern for bacterial infection, consider observing off antibiotics or discontinuation of antibiotics and continue supportive care. If the respiratory panel is positive for atypical bacterial infection (Bordetella pertussis, Chlamydophila pneumoniae, or Mycoplasma pneumoniae), consider antibiotic de-escalation to target atypical bacterial infection.    CBC & Differential [[id number]]  (Abnormal) Collected: 04/22/25 1318    Specimen: Blood Updated: 04/22/25 1408    Narrative:      The following orders were created for panel order CBC & Differential.  Procedure                               Abnormality         Status                     ---------                               -----------         ------                     CBC Auto Differential[[id number]]        Abnormal            Final result               Scan Slide[[id number]]                                       Final result                 Please view results for these tests on the individual orders.    CBC Auto Differential [[id number]]  (Abnormal) Collected: 04/22/25 1318    Specimen: Blood Updated: 04/22/25 1408     WBC 6.25 10*3/mm3      RBC 2.67 10*6/mm3      Hemoglobin 8.1 g/dL       Hematocrit 26.6 %      MCV 99.6 fL      MCH 30.3 pg      MCHC 30.5 g/dL      RDW 18.2 %      RDW-SD 66.4 fl      MPV 11.0 fL      Platelets 230 10*3/mm3      Neutrophil % 57.4 %      Lymphocyte % 24.8 %      Monocyte % 9.1 %      Eosinophil % 6.7 %      Basophil % 0.6 %      Immature Grans % 1.4 %      Neutrophils, Absolute 3.58 10*3/mm3      Lymphocytes, Absolute 1.55 10*3/mm3      Monocytes, Absolute 0.57 10*3/mm3      Eosinophils, Absolute 0.42 10*3/mm3      Basophils, Absolute 0.04 10*3/mm3      Immature Grans, Absolute 0.09 10*3/mm3      nRBC 0.0 /100 WBC     Scan Slide [[id number]] Collected: 04/22/25 1318    Specimen: Blood Updated: 04/22/25 1408     Anisocytosis Slight/1+     Hypochromia Slight/1+     WBC Morphology Normal     Giant Platelets Slight/1+    BNP [[id number]]  (Abnormal) Collected: 04/22/25 1318    Specimen: Blood Updated: 04/22/25 1347     proBNP 28,839.0 pg/mL     Narrative:      This assay is used as an aid in the diagnosis of individuals suspected of having heart failure. It can be used as an aid in the diagnosis of acute decompensated heart failure (ADHF) in patients presenting with signs and symptoms of ADHF to the emergency department (ED). In addition, NT-proBNP of <300 pg/mL indicates ADHF is not likely.    Age Range Result Interpretation  NT-proBNP Concentration (pg/mL:      <50             Positive            >450                   Gray                 300-450                    Negative             <300    50-75           Positive            >900                  Gray                300-900                  Negative            <300      >75             Positive            >1800                  Gray                300-1800                  Negative            <300             Imaging Results (Last 24 Hours)       Procedure Component Value Units Date/Time    XR Abdomen 2+ VW with Chest 1 VW [[id number]] Collected: 04/22/25 1407     Updated: 04/22/25 1414    Narrative:      XR ABDOMEN 2+  VIEWS W CHEST 1 VW    Date of Exam: 4/22/2025 1:20 PM EDT    Indication: dyspnea, constipation    Comparison: Chest radiograph 8/18/2025    Findings:  Chest: Cardiomegaly. Diffuse increased interstitial opacities, including smooth septal thickening and suspect reticular nodular airspace opacities subtle blunting of the costophrenic angles, question trace effusions. No pneumothorax.    ABDOMEN: Large volume colonic stool. No dilated loops of bowel. Peritoneal dialysis catheter noted. No significant gastric distention. No visualized renal calcifications. Degenerative related osseous change.      Impression:      Impression:  1. Large volume colonic stool without obstruction, in keeping with constipation.  2. Cardiomegaly with findings suggesting interstitial pulmonary edema. Superimposed infectious airspace process including atypical etiologies and aspiration possible in the right clinical setting.      Electronically Signed: Epifanio Epps MD    4/22/2025 2:12 PM EDT    Workstation ID: [id number]              Assessment & Plan        This is a 54 y.o. male with:    Active and Resolved Problems  Active Hospital Problems    Diagnosis  POA    **Acute hypoxic respiratory failure [J96.01]  Yes      Resolved Hospital Problems   No resolved problems to display.       AHRF  Fluid overload  ESRD on PD  - Hypoxic to 86% on RA, not on O2 at home. Likely 2/2 fluid overload. CXR with pulmonary edema, no significant effusion noted. Possible superimposed airspace disease on read, pt afebrile with wbc wnl, does not have any infectious symptoms. RVP negative. Defer abx at this time.   - CHF as below  - Nephrology consulted in ED, appreciate assistance. Per ED planning for additional PD for fluid removal  - Diuresis  - trend labs, renally dose medications  - cont pulse ox, wean O2 as able    CHF  Elevated troponin  - Troponin 113 -> 113, BNP 04083, ESRD as above. ECG with sinus tach, possible TWI V1-V3, no previous ecg. Pt without  chest pain/pressure. Has Hx CHF. CXR with cardiomegaly and congestion as above  - TTE 1/26/24 Ki EF 64%, no significant valvular disease noted.  - Diuresis  - Cont home medications  - Echo pending  - Heart failure navigator consulted, appreciate assistance.   - Telemetry  - daily weight, I/O  - consider cardiology eval pending echo    Chronic pain  Constipation  - Pt on home opioid for neuropathy/arthritis  - Abd XR with large stool burden, no signs of obstruction  - scheduled bowel regimen, movantik. May need enema if not sufficient    T2DM  - A1C pending  - Per chart review, previously on lantus 20 QHS and aspart 8units TIDAC. Pt states he takes insulin but has more recently been taking 30 units every other day and short acting insulin when he feels his sugar is low, states he changed his diet.   - Will start lantus 15 qhs and SSI, titrate. Diabetic educator consulted, appreciate assistance    Htn  - BP elevated OA, pt w/o chest pain/headache/vision change. Reports medication compliance though has not been taking other medications as prescribed   - Resume home medications once verified, titrate as needd    Anemia of chronic disease  - Hgb 8.1 OA, stable from previous. Pt w/o bleeding signs/Sx      Home medications pending verification        VTE Prophylaxis:  Pharmacologic VTE prophylaxis orders are present.        The patient desires to be as follows:    CODE STATUS:    Code Status (Patient has no pulse and is not breathing): CPR (Attempt to Resuscitate)  Medical Interventions (Patient has pulse or is breathing): Full Support  Level Of Support Discussed With: Patient        Stuart Stephen, who can be contacted at [phone] , is the designated person to make medical decisions on the patient's behalf if He is incapable of doing so. This was clarified with patient and/or next of kin on 4/22/2025 during the course of this H&P.    Admission Status:  I believe this patient meets observation status.    Expected  Length of Stay: <2 midnights    PDMP and Medication Dispenses via Sidebar reviewed and consistent with patient reported medications.    I discussed the patient's findings and my recommendations with patient.      Signature:     This document has been electronically signed by Fernando Kirby MD on April 22, 2025 15:49 EDT   [facility] Hospitalist Team

## 2025-04-22 NOTE — CASE MANAGEMENT/SOCIAL WORK
Discharge Planning Assessment   [facility]     Patient Name: Brandon Stephen  MRN: [id number]  Today's Date: 4/22/2025    Admit Date: 4/22/2025    Plan: From Home; Watch for [place] O[facility]   Discharge Needs Assessment       Row Name 04/22/25 1656       Living Environment    People in Home other (see comments)  Roommate    Current Living Arrangements apartment    Potentially Unsafe Housing Conditions none    In the past 12 months has the electric, gas, oil, or water company threatened to shut off services in your home? No    Primary Care Provided by self    Provides Primary Care For no one    Family Caregiver if Needed child(neetu), adult    Family Caregiver Names Daughter    Quality of Family Relationships unable to assess    Able to Return to Prior Arrangements yes    Living Arrangement Comments Home       Resource/Environmental Concerns    Resource/Environmental Concerns none    Transportation Concerns none       Transportation Needs    In the past 12 months, has lack of transportation kept you from medical appointments or from getting medications? no    In the past 12 months, has lack of transportation kept you from meetings, work, or from getting things needed for daily living? No       Food Insecurity    Within the past 12 months, you worried that your food would run out before you got the money to buy more. Never true    Within the past 12 months, the food you bought just didn't last and you didn't have money to get more. Never true       Transition Planning    Patient/Family Anticipates Transition to home    Patient/Family Anticipated Services at Transition none    Transportation Anticipated family or friend will provide;car, drives self       Discharge Needs Assessment    Readmission Within the Last 30 Days no previous admission in last 30 days    Equipment Currently Used at Home none    Concerns to be Addressed discharge planning    Do you want help finding or keeping work or a job? I do not need or want help    Do  you want help with school or training? For example, starting or completing job training or getting a high school diploma, GED or equivalent No    Anticipated Changes Related to Illness none    Equipment Needed After Discharge none    Provided Post Acute Provider List? N/A    Provided Post Acute Provider Quality & Resource List? N/A    Offered/Gave Vendor List no                   Discharge Plan       Row Name 04/22/25 1651       Plan    Plan From Home; Watch for [place] O[facility]    Patient/Family in Agreement with Plan unable to assess    Plan Comments Met with patient at bedside, confirmed PCP and updated Pharm. States he is independent with ADLs and IADLs, denies financial concerns and family can provide dc transportation.  Currently requiring O2 at 2L with RA as baseline, watch for new home O2 needs. Discussed dc plan, declines HH or SNF needs and anticipates return home.                                                                                                               DC Barriers: New O2 Needs; IV Meds; Tele                       Demographic Summary       Row Name 04/22/25 0505       General Information    Admission Type observation    Arrived From home    Referral Source emergency department    Reason for Consult discharge planning    Preferred Language English       Contact Information    Permission Granted to Share Info With                    Functional Status       Row Name 04/22/25 1470       Functional Status    Usual Activity Tolerance excellent    Current Activity Tolerance good       Physical Activity    On average, how many days per week do you engage in moderate to strenuous exercise (like a brisk walk)? 7 days    On average, how many minutes do you engage in exercise at this level? 40 min    Number of minutes of exercise per week 280       Assessment of Health Literacy    How often do you have someone help you read hospital materials? Occasionally    How often do you have problems  learning about your medical condition because of difficulty understanding written information? Sometimes    How often do you have a problem understanding what is told to you about your medical condition? Occasionally    How confident are you filling out medical forms by yourself? Quite a bit    Health Literacy Good       Functional Status, IADL    Medications independent    Meal Preparation independent    Housekeeping independent    Laundry independent    Shopping independent    If for any reason you need help with day-to-day activities such as bathing, preparing meals, shopping, managing finances, etc., do you get the help you need? I don't need any help       Mental Status    General Appearance WDL WDL       Mental Status Summary    Recent Changes in Mental Status/Cognitive Functioning no changes

## 2025-04-22 NOTE — ED PROVIDER NOTES
Subjective   Chief Complaint   Patient presents with    Constipation     Pt reports constipation, for 4 days, Pt has taken stool softeners at home, pt has some abd pain,   Pt reports he tried to dig out the stool  PT does home dialysis nightly.           History of Present Illness  Patient is a 54-year-old male presents the emergency department for evaluation of constipation.  Patient reports this occurs frequently as he is on pain medication.  He last had about 4 days ago.  He is not having severe abdominal pain.  No nausea vomiting.  No fevers.    Patient noted to be hypoxic, he was placed on oxygen.  He does report he felt short of breath on the way into today.  He denies any chest pain.  No dizziness, lightheadedness or syncope.  No fevers or chills.  No cough    Patient does do peritoneal dialysis nightly  Review of Systems    Past Medical History:   Diagnosis Date    Arthritis     CHF (congestive heart failure)     Diabetes mellitus     Diabetic skin ulcer     Hip pain     Hypertension     Kidney disease     Leg edema     Leg pain, bilateral     Neuropathy        No Known Allergies    Past Surgical History:   Procedure Laterality Date    AMPUTATION FOOT / TOE Left     Left great toe-[facility]ed from [facility]    BELOW KNEE LEG AMPUTATION Right 11/2017    CATARACT EXTRACTION      Cataract  surgery-Abstracted from [facility]    FOOT SURGERY Right     Right foot surgery on diabetic ulcer-abstracted from [facility]    OTHER SURGICAL HISTORY      [facility]       Family History   Problem Relation Age of Onset    Diabetes Mother     Heart disease Father     Other Brother         Other Cancer-Abstracted from [facility]    Diabetes Brother        Social History     Socioeconomic History    Marital status: Single   Tobacco Use    Smoking status: Former     Current packs/day: 0.50     Types: Cigarettes    Smokeless tobacco: Never   Vaping Use    Vaping status: Never Used   Substance and Sexual Activity     Alcohol use: No    Drug use: Defer    Sexual activity: Defer           Objective   Physical Exam  Vitals and nursing note reviewed.   Constitutional:       Appearance: Normal appearance. He is not ill-appearing or toxic-appearing.      Comments: Patient resting comfortably, sitting up on the side of bed using his phone.  Nasal cannula in place   HENT:      Head: Normocephalic and atraumatic.      Nose: Nose normal.      Mouth/Throat:      Mouth: Mucous membranes are moist.      Pharynx: Oropharynx is clear.   Eyes:      Conjunctiva/sclera: Conjunctivae normal.      Pupils: Pupils are equal, round, and reactive to light.   Cardiovascular:      Rate and Rhythm: Normal rate and regular rhythm.      Heart sounds: Normal heart sounds. No murmur heard.     No friction rub. No gallop.   Pulmonary:      Effort: Pulmonary effort is normal.      Breath sounds: Normal breath sounds.   Abdominal:      General: Bowel sounds are normal.      Palpations: Abdomen is soft.      Tenderness: There is no abdominal tenderness. There is no guarding or rebound.   Musculoskeletal:         General: Normal range of motion.      Cervical back: Normal range of motion and neck supple.      Right lower leg: No edema.      Left lower leg: No edema.   Skin:     General: Skin is warm and dry.      Capillary Refill: Capillary refill takes less than 2 seconds.      Findings: No rash.   Neurological:      General: No focal deficit present.      Mental Status: He is alert and oriented to person, place, and time.   Psychiatric:         Mood and Affect: Mood normal.         Behavior: Behavior normal.         Procedures           ED Course  ED Course as of 04/22/25 2044 Tue Apr 22, 2025   1500 Discussed with nephrology, Dr. Sam.  He is going to place dialysis orders for the patient.  Patient does PD dialysis. [LB]   1530 Discussed with Dr. Kirby [LB]      ED Course User Index  [LB] Agustina Smith, APRN        XR Abdomen 2+ VW with Chest 1  VW  Result Date: 4/22/2025  Impression: 1. Large volume colonic stool without obstruction, in keeping with constipation. 2. Cardiomegaly with findings suggesting interstitial pulmonary edema. Superimposed infectious airspace process including atypical etiologies and aspiration possible in the right clinical setting. Electronically Signed: Epifanio Epps MD  4/22/2025 2:12 PM EDT  Workstation ID: [id number]                                                   Medical Decision Making  Problems Addressed:  Dyspnea on exertion: complicated acute illness or injury  Hypoxia: complicated acute illness or injury    Amount and/or Complexity of Data Reviewed  Labs: ordered.  Radiology: ordered.  ECG/medicine tests: ordered.    Risk  Prescription drug management.  Decision regarding hospitalization.    Appropriate PPE worn during patient interactions.    EKG sinus tachycardia rate 104.  MI 48, QTc 550 no ST segment changes.  Compared to 6/3/2016.  Interpreted by ED attending physician Dr. Floyd  Differential diagnoses considered for patient chief complaint and presentation, this list is not all inclusive of diagnoses considered: Constipation, bowel obstruction, pneumonia, CHF.  Patient presented for constipation, however he was noted be hypoxic requiring 2 L of oxygen upon arrival.  He does not have any respiratory symptoms such as cough, congestion or fever however he does report he has dyspnea on exertion.  Patient's x-ray is concerning for pulmonary edema, no leukocytosis, again no fever.  Resp panel is negative.  Doubt pneumonia at this time given patient's symptom and exam.  Given his pulmonary edema, need for oxygen, will admit patient to hospital medicine.  His troponin is elevated 113, however he has no acute ST changes on his EKG, he had no change in his troponin, I feel this is most likely secondary to his renal disease.  He is without any chest pain.  Discussed with Dr. Hyman, hospitalist, agrees with admission.     I  discussed my findings and plan of care with the patient, he is agreeable at this time    I discussed with the patient their test results, work-up here in the emergency department, and need for admission and further evaluation. Patient is agreeable to the plan of care. At time of disposition patient's VS are reviewed, and patient without acute distress.  Opportunity was provided for questions at the bedside, all questions and concerns were addressed.      Note Disclaimer: At [facility], we believe that sharing information builds trust and better relationships. You are receiving this note because you recently visited [facility]. It is possible you will see health information before a provider has talked with you about it. This kind of information can be easy to misunderstand. To help you fully understand what it means for your health, we urge you to discuss this note with your provider  Note dictated utilizing Dragon Dictation.          Final diagnoses:   Dyspnea on exertion   Hypoxia       ED Disposition  ED Disposition       ED Disposition   Decision to Admit    Condition   --    Comment   Level of Care: Telemetry [5]   Diagnosis: Acute hypoxic respiratory failure [2323010]   Admitting Physician: HALI JACOBS [[id number]]   Attending Physician: HALI JACOBS [[id number]]                 No follow-up provider specified.       Medication List        ASK your doctor about these medications      oxyCODONE HCl 10 MG tablet   Ask about: Which instructions should I use?                 Agustina Smith, APRN  04/22/25 2045

## 2025-04-22 NOTE — ED NOTES
This RN called lab to let them know there was an extra green and purple tube sent down. Lab said they would use it to re run the trop and CMP.

## 2025-04-22 NOTE — Clinical Note
Level of Care: Telemetry [5]   Admitting Physician: HALI JACOBS [[id number]]   Attending Physician: HALI JACOBS [[id number]]

## 2025-04-23 ENCOUNTER — APPOINTMENT (OUTPATIENT)
Dept: CARDIOLOGY | Facility: HOSPITAL | Age: 55
End: 2025-04-23
Payer: MEDICAID

## 2025-04-23 LAB
ANION GAP SERPL CALCULATED.3IONS-SCNC: 15.2 MMOL/L (ref 5–15)
AORTIC DIMENSIONLESS INDEX: 0.87 (DI)
AV MEAN PRESS GRAD SYS DOP V1V2: 6 MMHG
AV VMAX SYS DOP: 164 CM/SEC
BASOPHILS # BLD AUTO: 0.05 10*3/MM3 (ref 0–0.2)
BASOPHILS NFR BLD AUTO: 0.8 % (ref 0–1.5)
BH CV ECHO LEFT VENTRICLE GLOBAL LONGITUDINAL STRAIN: -14.7 %
BH CV ECHO MEAS - ACS: 2.1 CM
BH CV ECHO MEAS - AO MAX PG: 10.8 MMHG
BH CV ECHO MEAS - AO V2 VTI: 34.1 CM
BH CV ECHO MEAS - AVA(I,D): 3.6 CM2
BH CV ECHO MEAS - EDV(CUBED): 166.4 ML
BH CV ECHO MEAS - EDV(MOD-SP4): 109 ML
BH CV ECHO MEAS - EF(MOD-SP4): 58.3 %
BH CV ECHO MEAS - ESV(CUBED): 46.7 ML
BH CV ECHO MEAS - ESV(MOD-SP4): 45.5 ML
BH CV ECHO MEAS - FS: 34.5 %
BH CV ECHO MEAS - IVS/LVPW: 1 CM
BH CV ECHO MEAS - IVSD: 1.3 CM
BH CV ECHO MEAS - LA DIMENSION: 3.8 CM
BH CV ECHO MEAS - LAT PEAK E' VEL: 9.9 CM/SEC
BH CV ECHO MEAS - LV DIASTOLIC VOL/BSA (35-75): 47.9 CM2
BH CV ECHO MEAS - LV MASS(C)D: 304.3 GRAMS
BH CV ECHO MEAS - LV MAX PG: 8.1 MMHG
BH CV ECHO MEAS - LV MEAN PG: 4 MMHG
BH CV ECHO MEAS - LV SYSTOLIC VOL/BSA (12-30): 20 CM2
BH CV ECHO MEAS - LV V1 MAX: 142 CM/SEC
BH CV ECHO MEAS - LV V1 VTI: 27.2 CM
BH CV ECHO MEAS - LVIDD: 5.5 CM
BH CV ECHO MEAS - LVIDS: 3.6 CM
BH CV ECHO MEAS - LVOT AREA: 4.5 CM2
BH CV ECHO MEAS - LVOT DIAM: 2.4 CM
BH CV ECHO MEAS - LVPWD: 1.3 CM
BH CV ECHO MEAS - MED PEAK E' VEL: 7.9 CM/SEC
BH CV ECHO MEAS - MR MAX PG: 82.8 MMHG
BH CV ECHO MEAS - MR MAX VEL: 455 CM/SEC
BH CV ECHO MEAS - MV A MAX VEL: 130 CM/SEC
BH CV ECHO MEAS - MV DEC SLOPE: 884 CM/SEC2
BH CV ECHO MEAS - MV DEC TIME: 0.2 SEC
BH CV ECHO MEAS - MV E MAX VEL: 103 CM/SEC
BH CV ECHO MEAS - MV E/A: 0.79
BH CV ECHO MEAS - MV MAX PG: 6.2 MMHG
BH CV ECHO MEAS - MV MEAN PG: 3 MMHG
BH CV ECHO MEAS - MV P1/2T: 33.5 MSEC
BH CV ECHO MEAS - MV V2 VTI: 20.9 CM
BH CV ECHO MEAS - MVA(P1/2T): 6.6 CM2
BH CV ECHO MEAS - MVA(VTI): 5.9 CM2
BH CV ECHO MEAS - PA ACC TIME: 0.08 SEC
BH CV ECHO MEAS - PA V2 MAX: 110 CM/SEC
BH CV ECHO MEAS - PULM A REVS DUR: 0.1 SEC
BH CV ECHO MEAS - PULM A REVS VEL: 32.3 CM/SEC
BH CV ECHO MEAS - PULM DIAS VEL: 70.5 CM/SEC
BH CV ECHO MEAS - PULM S/D: 0.72
BH CV ECHO MEAS - PULM SYS VEL: 50.5 CM/SEC
BH CV ECHO MEAS - RAP SYSTOLE: 8 MMHG
BH CV ECHO MEAS - RV MAX PG: 2.7 MMHG
BH CV ECHO MEAS - RV V1 MAX: 82.2 CM/SEC
BH CV ECHO MEAS - RV V1 VTI: 15.5 CM
BH CV ECHO MEAS - RVDD: 4.5 CM
BH CV ECHO MEAS - RVSP: 48.2 MMHG
BH CV ECHO MEAS - SV(LVOT): 123 ML
BH CV ECHO MEAS - SV(MOD-SP4): 63.5 ML
BH CV ECHO MEAS - SVI(LVOT): 54.1 ML/M2
BH CV ECHO MEAS - SVI(MOD-SP4): 27.9 ML/M2
BH CV ECHO MEAS - TAPSE (>1.6): 2.19 CM
BH CV ECHO MEAS - TR MAX PG: 40.2 MMHG
BH CV ECHO MEAS - TR MAX VEL: 317 CM/SEC
BH CV ECHO MEASUREMENTS AVERAGE E/E' RATIO: 11.57
BH CV XLRA - TDI S': 15 CM/SEC
BUN SERPL-MCNC: 59 MG/DL (ref 6–20)
BUN/CREAT SERPL: 6.5 (ref 7–25)
CALCIUM SPEC-SCNC: 7.4 MG/DL (ref 8.6–10.5)
CHLORIDE SERPL-SCNC: 106 MMOL/L (ref 98–107)
CO2 SERPL-SCNC: 23.8 MMOL/L (ref 22–29)
CREAT SERPL-MCNC: 9.1 MG/DL (ref 0.76–1.27)
DEPRECATED RDW RBC AUTO: 65.1 FL (ref 37–54)
EGFRCR SERPLBLD CKD-EPI 2021: 6.3 ML/MIN/1.73
EOSINOPHIL # BLD AUTO: 0.43 10*3/MM3 (ref 0–0.4)
EOSINOPHIL NFR BLD AUTO: 6.5 % (ref 0.3–6.2)
ERYTHROCYTE [DISTWIDTH] IN BLOOD BY AUTOMATED COUNT: 17.9 % (ref 12.3–15.4)
GLUCOSE BLDC GLUCOMTR-MCNC: 104 MG/DL (ref 70–105)
GLUCOSE BLDC GLUCOMTR-MCNC: 114 MG/DL (ref 70–105)
GLUCOSE BLDC GLUCOMTR-MCNC: 163 MG/DL (ref 70–105)
GLUCOSE BLDC GLUCOMTR-MCNC: 177 MG/DL (ref 70–105)
GLUCOSE BLDC GLUCOMTR-MCNC: 183 MG/DL (ref 70–105)
GLUCOSE SERPL-MCNC: 154 MG/DL (ref 65–99)
HCT VFR BLD AUTO: 28.7 % (ref 37.5–51)
HGB BLD-MCNC: 8.6 G/DL (ref 13–17.7)
IMM GRANULOCYTES # BLD AUTO: 0.04 10*3/MM3 (ref 0–0.05)
IMM GRANULOCYTES NFR BLD AUTO: 0.6 % (ref 0–0.5)
LEFT ATRIUM VOLUME INDEX: 39.8 ML/M2
LV EF BIPLANE MOD: 58 %
LYMPHOCYTES # BLD AUTO: 1.34 10*3/MM3 (ref 0.7–3.1)
LYMPHOCYTES NFR BLD AUTO: 20.2 % (ref 19.6–45.3)
MAGNESIUM SERPL-MCNC: 2.1 MG/DL (ref 1.6–2.6)
MCH RBC QN AUTO: 29.9 PG (ref 26.6–33)
MCHC RBC AUTO-ENTMCNC: 30 G/DL (ref 31.5–35.7)
MCV RBC AUTO: 99.7 FL (ref 79–97)
MONOCYTES # BLD AUTO: 0.57 10*3/MM3 (ref 0.1–0.9)
MONOCYTES NFR BLD AUTO: 8.6 % (ref 5–12)
NEUTROPHILS NFR BLD AUTO: 4.2 10*3/MM3 (ref 1.7–7)
NEUTROPHILS NFR BLD AUTO: 63.3 % (ref 42.7–76)
NRBC BLD AUTO-RTO: 0 /100 WBC (ref 0–0.2)
PHOSPHATE SERPL-MCNC: 5.4 MG/DL (ref 2.5–4.5)
PLATELET # BLD AUTO: 275 10*3/MM3 (ref 140–450)
PMV BLD AUTO: 10.8 FL (ref 6–12)
POTASSIUM SERPL-SCNC: 4.2 MMOL/L (ref 3.5–5.2)
PTH-INTACT SERPL-MCNC: 1355 PG/ML (ref 15–65)
QT INTERVAL: 422 MS
QTC INTERVAL: 550 MS
RBC # BLD AUTO: 2.88 10*6/MM3 (ref 4.14–5.8)
SINUS: 3.3 CM
SODIUM SERPL-SCNC: 145 MMOL/L (ref 136–145)
WBC NRBC COR # BLD AUTO: 6.63 10*3/MM3 (ref 3.4–10.8)

## 2025-04-23 PROCEDURE — 93306 TTE W/DOPPLER COMPLETE: CPT

## 2025-04-23 PROCEDURE — 85025 COMPLETE CBC W/AUTO DIFF WBC: CPT | Performed by: STUDENT IN AN ORGANIZED HEALTH CARE EDUCATION/TRAINING PROGRAM

## 2025-04-23 PROCEDURE — G0378 HOSPITAL OBSERVATION PER HR: HCPCS

## 2025-04-23 PROCEDURE — 96376 TX/PRO/DX INJ SAME DRUG ADON: CPT

## 2025-04-23 PROCEDURE — 93356 MYOCRD STRAIN IMG SPCKL TRCK: CPT

## 2025-04-23 PROCEDURE — 82948 REAGENT STRIP/BLOOD GLUCOSE: CPT

## 2025-04-23 PROCEDURE — 96372 THER/PROPH/DIAG INJ SC/IM: CPT

## 2025-04-23 PROCEDURE — 83735 ASSAY OF MAGNESIUM: CPT | Performed by: STUDENT IN AN ORGANIZED HEALTH CARE EDUCATION/TRAINING PROGRAM

## 2025-04-23 PROCEDURE — 82948 REAGENT STRIP/BLOOD GLUCOSE: CPT | Performed by: STUDENT IN AN ORGANIZED HEALTH CARE EDUCATION/TRAINING PROGRAM

## 2025-04-23 PROCEDURE — 25010000002 HEPARIN (PORCINE) PER 1000 UNITS: Performed by: STUDENT IN AN ORGANIZED HEALTH CARE EDUCATION/TRAINING PROGRAM

## 2025-04-23 PROCEDURE — 83970 ASSAY OF PARATHORMONE: CPT

## 2025-04-23 PROCEDURE — 96375 TX/PRO/DX INJ NEW DRUG ADDON: CPT

## 2025-04-23 PROCEDURE — 93356 MYOCRD STRAIN IMG SPCKL TRCK: CPT | Performed by: INTERNAL MEDICINE

## 2025-04-23 PROCEDURE — 80048 BASIC METABOLIC PNL TOTAL CA: CPT | Performed by: STUDENT IN AN ORGANIZED HEALTH CARE EDUCATION/TRAINING PROGRAM

## 2025-04-23 PROCEDURE — 93306 TTE W/DOPPLER COMPLETE: CPT | Performed by: INTERNAL MEDICINE

## 2025-04-23 PROCEDURE — 63710000001 INSULIN LISPRO (HUMAN) PER 5 UNITS: Performed by: STUDENT IN AN ORGANIZED HEALTH CARE EDUCATION/TRAINING PROGRAM

## 2025-04-23 PROCEDURE — 84100 ASSAY OF PHOSPHORUS: CPT | Performed by: STUDENT IN AN ORGANIZED HEALTH CARE EDUCATION/TRAINING PROGRAM

## 2025-04-23 PROCEDURE — G0257 UNSCHED DIALYSIS ESRD PT HOS: HCPCS

## 2025-04-23 PROCEDURE — 25010000002 BUMETANIDE PER 0.5 MG: Performed by: STUDENT IN AN ORGANIZED HEALTH CARE EDUCATION/TRAINING PROGRAM

## 2025-04-23 PROCEDURE — 25010000002 HYDRALAZINE PER 20 MG: Performed by: FAMILY MEDICINE

## 2025-04-23 RX ORDER — SODIUM CHLORIDE, SODIUM LACTATE, CALCIUM CHLORIDE, MAGNESIUM CHLORIDE AND DEXTROSE 2.5; 538; 448; 25.7; 5.08 G/100ML; MG/100ML; MG/100ML; MG/100ML; MG/100ML
2000 INJECTION, SOLUTION INTRAPERITONEAL
Status: DISCONTINUED | OUTPATIENT
Start: 2025-04-23 | End: 2025-04-24 | Stop reason: HOSPADM

## 2025-04-23 RX ORDER — LOSARTAN POTASSIUM 50 MG/1
100 TABLET ORAL
Status: DISCONTINUED | OUTPATIENT
Start: 2025-04-23 | End: 2025-04-24 | Stop reason: HOSPADM

## 2025-04-23 RX ORDER — HYDRALAZINE HYDROCHLORIDE 20 MG/ML
10 INJECTION INTRAMUSCULAR; INTRAVENOUS EVERY 4 HOURS PRN
Status: DISCONTINUED | OUTPATIENT
Start: 2025-04-23 | End: 2025-04-24 | Stop reason: HOSPADM

## 2025-04-23 RX ORDER — CALCITRIOL 0.25 UG/1
0.25 CAPSULE, LIQUID FILLED ORAL DAILY
Status: DISCONTINUED | OUTPATIENT
Start: 2025-04-24 | End: 2025-04-24 | Stop reason: HOSPADM

## 2025-04-23 RX ORDER — ICODEXTRIN, SODIUM CHLORIDE, SODIUM LACTATE, CALCIUM CHLORIDE, MAGNESIUM CHLORIDE 7.5; 535; 448; 25.7; 5.08 G/100ML; MG/100ML; MG/100ML; MG/100ML; MG/100ML
2000 INJECTION, SOLUTION INTRAPERITONEAL
Status: DISCONTINUED | OUTPATIENT
Start: 2025-04-23 | End: 2025-04-24 | Stop reason: HOSPADM

## 2025-04-23 RX ORDER — CARVEDILOL 25 MG/1
25 TABLET ORAL 2 TIMES DAILY WITH MEALS
Status: DISCONTINUED | OUTPATIENT
Start: 2025-04-23 | End: 2025-04-24 | Stop reason: HOSPADM

## 2025-04-23 RX ORDER — CLONIDINE HYDROCHLORIDE 0.1 MG/1
0.1 TABLET ORAL ONCE
Status: DISCONTINUED | OUTPATIENT
Start: 2025-04-23 | End: 2025-04-24 | Stop reason: HOSPADM

## 2025-04-23 RX ORDER — SEVELAMER CARBONATE 800 MG/1
1600 TABLET, FILM COATED ORAL
Status: DISCONTINUED | OUTPATIENT
Start: 2025-04-23 | End: 2025-04-24 | Stop reason: HOSPADM

## 2025-04-23 RX ORDER — AMLODIPINE BESYLATE 5 MG/1
10 TABLET ORAL
Status: DISCONTINUED | OUTPATIENT
Start: 2025-04-23 | End: 2025-04-24 | Stop reason: HOSPADM

## 2025-04-23 RX ADMIN — SEVELAMER CARBONATE 1600 MG: 800 TABLET, FILM COATED ORAL at 17:06

## 2025-04-23 RX ADMIN — HYDRALAZINE HYDROCHLORIDE 100 MG: 25 TABLET ORAL at 21:04

## 2025-04-23 RX ADMIN — ASPIRIN 325 MG ORAL TABLET 325 MG: 325 PILL ORAL at 10:00

## 2025-04-23 RX ADMIN — HYDRALAZINE HYDROCHLORIDE 100 MG: 25 TABLET ORAL at 09:59

## 2025-04-23 RX ADMIN — CINACALCET HYDROCHLORIDE 60 MG: 30 TABLET, FILM COATED ORAL at 10:00

## 2025-04-23 RX ADMIN — INSULIN LISPRO 2 UNITS: 100 INJECTION, SOLUTION INTRAVENOUS; SUBCUTANEOUS at 17:06

## 2025-04-23 RX ADMIN — HYDRALAZINE HYDROCHLORIDE 100 MG: 25 TABLET ORAL at 17:06

## 2025-04-23 RX ADMIN — CARVEDILOL 25 MG: 25 TABLET, FILM COATED ORAL at 17:07

## 2025-04-23 RX ADMIN — SODIUM CHLORIDE, SODIUM LACTATE, CALCIUM CHLORIDE, MAGNESIUM CHLORIDE AND DEXTROSE 2000 ML: 2.5; 538; 448; 25.7; 5.08 INJECTION, SOLUTION INTRAPERITONEAL at 13:00

## 2025-04-23 RX ADMIN — SEVELAMER CARBONATE 1600 MG: 800 TABLET, FILM COATED ORAL at 13:06

## 2025-04-23 RX ADMIN — AMLODIPINE BESYLATE 10 MG: 5 TABLET ORAL at 10:00

## 2025-04-23 RX ADMIN — LOSARTAN POTASSIUM 100 MG: 50 TABLET, FILM COATED ORAL at 10:10

## 2025-04-23 RX ADMIN — BUMETANIDE 1 MG: 0.25 INJECTION INTRAMUSCULAR; INTRAVENOUS at 05:44

## 2025-04-23 RX ADMIN — SODIUM CHLORIDE, SODIUM LACTATE, CALCIUM CHLORIDE, MAGNESIUM CHLORIDE AND DEXTROSE 2000 ML: 2.5; 538; 448; 25.7; 5.08 INJECTION, SOLUTION INTRAPERITONEAL at 17:46

## 2025-04-23 RX ADMIN — BUMETANIDE 1 MG: 0.25 INJECTION INTRAMUSCULAR; INTRAVENOUS at 17:07

## 2025-04-23 RX ADMIN — HEPARIN SODIUM 5000 UNITS: 5000 INJECTION INTRAVENOUS; SUBCUTANEOUS at 13:15

## 2025-04-23 RX ADMIN — HEPARIN SODIUM 5000 UNITS: 5000 INJECTION INTRAVENOUS; SUBCUTANEOUS at 05:44

## 2025-04-23 RX ADMIN — Medication 10 ML: at 09:59

## 2025-04-23 RX ADMIN — ICODEXTRIN, SODIUM CHLORIDE, SODIUM LACTATE, CALCIUM CHLORIDE, MAGNESIUM CHLORIDE 2000 ML: 7.5; 535; 448; 25.7; 5.08 INJECTION, SOLUTION INTRAPERITONEAL at 21:04

## 2025-04-23 RX ADMIN — HYDRALAZINE HYDROCHLORIDE 10 MG: 20 INJECTION INTRAMUSCULAR; INTRAVENOUS at 03:36

## 2025-04-23 RX ADMIN — BISACODYL 5 MG: 5 TABLET, COATED ORAL at 10:00

## 2025-04-23 RX ADMIN — CARVEDILOL 25 MG: 25 TABLET, FILM COATED ORAL at 13:06

## 2025-04-23 NOTE — CONSULTS
Diabetes Education    Patient Name:  Brandon Stephen  YOB: 1970  MRN: [id number]  Admit Date:  4/22/2025        MD consult for blood glucose >200. On 4/22/2025 A1c was 5.56% and admission blood sugar was 113. A1c info sheet given with discussion on A1c target and healthy blood sugar range. Patient stated he has the Dexcom G6 which he was started on about 3 months ago but has misplaced his reader. Patient stated he doesn't have a a glucometer for back up. At home patient stated he takes Lantus 20 units every other day at various times. Explained to patient that long acting insulin is best given daily at the same time each day. Patient stated it works for him and he doesn't want to give extra shots.  Prescriptions started in discharge orders for lancets, test strips, and Accu-chek Guide Me glucometer. Patient has no further questions or concerns related to diabetes at this time.      Electronically signed by:  Edwige Carrasco RN  04/23/25 11:25 EDT

## 2025-04-23 NOTE — CONSULTS
Patient Name: Brandon Stephen  : 1970  MRN: [id number]  Primary Care Physician: Eusebia Poon APRN  Date of admission: 2025    Patient Care Team:  Eusebia Poon APRN as PCP - General (Nurse Practitioner)        Reason for Consult:       ESRD on PD    Subjective   History of Present Illness:   Chief Complaint:   Chief Complaint   Patient presents with    Constipation     Pt reports constipation, for 4 days, Pt has taken stool softeners at home, pt has some abd pain,   Pt reports he tried to dig out the stool  PT does home dialysis nightly.           HISTORY:  Brandon Stephen is a 54 y.o. male well known to myself for ESRD on PD for which I follow. Additional PMH includes T2DM, ACD, CHF, htn, chronic pain. He presented to [facility] on 2025 with constipation and SOA. Reported he has not had a bowel movement in 4 days. Denies abd pain but states it is uncomfortable. He is on opioids for chronic pain and has had similar constipation previously. In ED he was found to be hypoxic to 86%, started on 2L O2 with improvement. Noted to be volume overloaded on exam and pt did report SOA/RINCON for the past 3+ days with worsening BLE edema. He does have Bumex and Metolazone at home but he states he does not take with any regularity. CXR in ED showed pulmonary edema, no significant effusion noted. He had ECHO done this morning which showed EF 56-60%, mild LVH, and G1DD. On arrival also noted to have hypertensive urgency. Now s/p total of 100mg IV hydralazine, as well as 100mg IV Labetolol IV. Nephrology services have been requested for ESRD on PD management.         Review of systems:  Negative other than above       Personal History:     Past Medical History:   Past Medical History:   Diagnosis Date    Arthritis     CHF (congestive heart failure)     Diabetes mellitus     Diabetic skin ulcer     Hip pain     Hypertension     Kidney disease     Leg edema     Leg pain, bilateral     Neuropathy         Surgical History:      Past Surgical History:   Procedure Laterality Date    AMPUTATION FOOT / TOE Left     Left great toe-Abstracted from [facility]    BELOW KNEE LEG AMPUTATION Right 11/2017    CATARACT EXTRACTION      Cataract  surgery-Abstracted from [facility]    FOOT SURGERY Right     Right foot surgery on diabetic ulcer-abstracted from [facility]    OTHER SURGICAL HISTORY      Abscess removal-Abstracted from [facility]       Family History: family history includes Diabetes in his brother and mother; Heart disease in his father; Other in his brother. Otherwise pertinent FHx was reviewed and unremarkable.     Social History:  reports that he has quit smoking. His smoking use included cigarettes. He has never used smokeless tobacco. Drug use questions deferred to the physician. He reports that he does not drink alcohol.    Medications:  Prior to Admission medications    Medication Sig Start Date End Date Taking? Authorizing Provider   aspirin 325 MG tablet Take 1 tablet by mouth Daily.   Yes Akua Gaston MD   cinacalcet (SENSIPAR) 60 MG tablet Take 1 tablet by mouth Every Other Day.   Yes Akua Gaston MD   cyclobenzaprine (FLEXERIL) 10 MG tablet Take 1 tablet by mouth Daily As Needed for Muscle Spasms.   Yes Akua Gaston MD   HUMALOG KWIKPEN 100 UNIT/ML solution pen-injector Inject 1-2 Units into the appropriate muscle as directed by prescriber 3 (Three) Times a Day With Meals. 4/23/19  Yes Akua Gaston MD   hydrALAZINE (APRESOLINE) 100 MG tablet Take 1 tablet by mouth 3 (Three) Times a Day. 4/23/19  Yes Akua Gaston MD   LANCATIE SOLOSTAR 100 UNIT/ML injection pen Inject 20 Units under the skin into the appropriate area as directed Daily. 6/10/19  Yes Akua Gaston MD   lidocaine (LIDODERM) 5 % Place 1 patch on the skin as directed by provider Every 12 (Twelve) Hours. Remove & Discard patch within 12 hours or as directed by MD   Yes Akua Gaston MD   oxyCODONE  HCl 10 MG tablet  Take 1 tablet by mouth 3 (Three) Times a Day As Needed (pain).   Yes Provider, MD Akua     Scheduled Meds:amLODIPine, 10 mg, Oral, Q24H  aspirin, 325 mg, Oral, Daily  senna-docusate sodium, 2 tablet, Oral, BID   And  polyethylene glycol, 17 g, Oral, Daily   And  bisacodyl, 5 mg, Oral, Daily  bumetanide, 1 mg, Intravenous, Q12H  cinacalcet, 60 mg, Oral, Every Other Day  cloNIDine, 0.1 mg, Oral, Once  Extraneal (soto #[id number]), 2,000 mL, Intraperitoneal, Daily  heparin (porcine), 5,000 Units, Subcutaneous, Q8H  hydrALAZINE, 100 mg, Oral, TID  insulin glargine, 15 Units, Subcutaneous, Nightly  insulin lispro, 2-9 Units, Subcutaneous, 4x Daily AC & at Bedtime  losartan, 100 mg, Oral, Q24H  Naloxegol Oxalate, 25 mg, Oral, QAM  sevelamer, 1,600 mg, Oral, TID With Meals  UltraBag/Dianeal PD-2/2.5% Dex (soto #[id number]), 2,000 mL, Intraperitoneal, 4 Exchanges Daily - Dwell Overnight      Continuous Infusions:   PRN Meds:  acetaminophen **OR** acetaminophen **OR** acetaminophen    senna-docusate sodium **AND** polyethylene glycol **AND** bisacodyl **AND** bisacodyl    cyclobenzaprine    dextrose    dextrose    glucagon (human recombinant)    hydrALAZINE    naloxone    nitroglycerin    oxyCODONE    [COMPLETED] Insert Peripheral IV **AND** sodium chloride  Allergies:  No Known Allergies    Objective   Exam:     Vital Signs  Temp:  [97.3 °F (36.3 °C)-98.6 °F (37 °C)] 98 °F (36.7 °C)  Heart Rate:  [] 97  Resp:  [12-18] 14  BP: (170-199)/() 189/106  SpO2:  [86 %-99 %] 96 %  on  Flow (L/min) (Oxygen Therapy):  [2-2.5] 2.5;   Device (Oxygen Therapy): room air  Body mass index is 28.63 kg/m².  EXAM  General:   male in no acute distress.    Head:      Normocephalic and atraumatic.    Eyes:      PERRL/EOM intact, conjunctivae and sclerae clear without nystagmus.    Neck:      No masses, thyromegaly,  trachea central   Lungs:    Clear bilaterally to auscultation.    Heart:      Regular rate and  rhythm, no murmur no gallop  Abd:        Soft, nontender, not distended, bowel sounds positive, no shifting dullness.  Msk:        No deformity or scoliosis noted of thoracic or lumbar spine.    Pulses:   Pulses normal in all 4 extremities.    Extremities:        No cyanosis or clubbing--++edema.    Neuro:    No focal deficits.   alert oriented x3  Skin:       Intact without lesions or rashes.    Psych:    Alert and cooperative; normal mood and affect; normal attention span       Results Review:  I have personally reviewed most recent Data :  [facility]  CBC    Results from last 7 days   Lab Units 04/23/25  0353 04/22/25  1318   WBC 10*3/mm3 6.63 6.25   HEMOGLOBIN g/dL 8.6* 8.1*   PLATELETS 10*3/mm3 275 230     CMP   Results from last 7 days   Lab Units 04/23/25  0353 04/22/25  1318   SODIUM mmol/L 145 143   POTASSIUM mmol/L 4.2 4.5   CHLORIDE mmol/L 106 106   CO2 mmol/L 23.8 21.6*   BUN mg/dL 59* 56*   CREATININE mg/dL 9.10* 8.92*   GLUCOSE mg/dL 154* 215*     ABG      XR Abdomen 2+ VW with Chest 1 VW  Result Date: 4/22/2025  Impression: 1. Large volume colonic stool without obstruction, in keeping with constipation. 2. Cardiomegaly with findings suggesting interstitial pulmonary edema. Superimposed infectious airspace process including atypical etiologies and aspiration possible in the right clinical setting. Electronically Signed: Epifanio Epps MD  4/22/2025 2:12 PM EDT  Workstation ID: [id number]      Results for orders placed during the hospital encounter of 04/22/25    Adult Transthoracic Echo Complete W/ Cont if Necessary Per Protocol    Interpretation Summary    Left ventricular systolic function is normal. Calculated left ventricular EF = 58% Left ventricular ejection fraction appears to be 56 - 60%.    Left ventricular wall thickness is consistent with mild concentric hypertrophy.    Left ventricular diastolic function is consistent with (grade I) impaired relaxation.    The right  ventricular cavity is mildly dilated.    Estimated right ventricular systolic pressure from tricuspid regurgitation is moderately elevated (45-55 mmHg).    Moderate pulmonary hypertension is present.    Global longitudinal LV strain (GLS) = -14.7%        Assessment & Plan   Assessment and Plan:         Acute hypoxic respiratory failure    ASSESSMENT:  ESRD on PD   HTN  Volume overload  Constipation   DM  Anemia, chronic   CHF    ECHO 4/23/25: EF 56-60%, mild LVH, and G1DD          PLAN :     Patient with ESRD on PD, will run script as follows: COPD with 2.5% solution, 4 exchanges, 2L per exchange. And then overnight dwell every night for 12 hours with icodextrin  Volume, expanded with some SOA, PD as above and continue Bumex 1mg IV q12. ECHO as above  Blood pressure, uncontrolled, resumed home meds including Losartan 100 mg daily, Hydralazine 100 mg TID, started Amlodipine 10 mg daily (at home on Nifedipine). Will also resume his home Coreg at 25mg BID. Started on Clonidine, okay to continue for now  Constipation, s/p Miralax and Dulcolax, documented stool occurrence X3. Need to prevent constipation   Continue Renvela. Home medications show Calcitriol, will restart as calcium level is low at 7.4.  Remaining electrolytes/acid base stable  Anemia, hgb stable but monitor, check iron studies   Daily labs   Thank you for this consultation, we will gladly follow       [facility]  4/23/2025  10:16 EDT    Patient is seen and examined by me.  I discussed the patient in detail with the nurse practitioner.  she transcribed above note for me I agree with her note.     [facility]

## 2025-04-23 NOTE — PLAN OF CARE
Goal Outcome Evaluation:      Patient's BP has been elevated, MD notified, see MAR. Remains on 2L NC. Receiving IV Bumex.

## 2025-04-23 NOTE — PROGRESS NOTES
"[facility] MEDICINE SERVICE  DAILY PROGRESS NOTE    NAME: Brandon Stephen  : 1970  MRN: [id number]      LOS: 0 days     PROVIDER OF SERVICE: LUCINDA King    Chief Complaint: Acute hypoxic respiratory failure    Subjective:     Interval History:  History taken from: patient chart    Patient seen and evaluated at bedside.  Patient is very chatty this morning.  Patient states that he is \"fine \".  Patient also states that the skin dryness to his LLE is due to his overload, however states \"they gave me that medicine and went down a lot \"    Review of Systems:   Please see above, review of systems otherwise negative     Objective:     Vital Signs  Temp:  [97.3 °F (36.3 °C)-98.2 °F (36.8 °C)] 98.1 °F (36.7 °C)  Heart Rate:  [] 100  Resp:  [12-17] 17  BP: (166-199)/() 166/100  Flow (L/min) (Oxygen Therapy):  [2-2.5] 2.5   Body mass index is 28.63 kg/m².    Physical Exam  Physical Exam  Constitutional:       General: He is not in acute distress.     Appearance: Normal appearance.   HENT:      Head: Normocephalic and atraumatic.      Mouth/Throat:      Mouth: Mucous membranes are moist.      Pharynx: Oropharynx is clear.   Eyes:      Extraocular Movements: Extraocular movements intact.      Conjunctiva/sclera: Conjunctivae normal.   Cardiovascular:      Rate and Rhythm: Regular rhythm. Tachycardia present.      Heart sounds: Normal heart sounds.   Pulmonary:      Effort: Pulmonary effort is normal.      Breath sounds: No wheezing.      Comments: + bibasilar crackles No incr wob on 2L    Abdominal:      General: Abdomen is flat. Bowel sounds are normal.      Palpations: Abdomen is soft.      Tenderness: There is no abdominal tenderness. There is no guarding or rebound.      Comments: PD catheter in place with dressing, no surrounding erythema/induration/drainage noted   Musculoskeletal:         General: No swelling or tenderness.      Cervical back: Normal range of motion and neck supple. "      Right lower leg: Edema present.      Left lower leg: Edema present.      Comments: S/p R AKA, L metatarsal amputation   Skin:     General: Skin is warm and dry.  Dry cracking is noted to left lower extremity.  Neurological:      General: No focal deficit present.      Mental Status: He is alert and oriented to person, place, and time. Mental status is at baseline.        Diagnostic Data    Results from last 7 days   Lab Units 04/23/25  0353   WBC 10*3/mm3 6.63   HEMOGLOBIN g/dL 8.6*   HEMATOCRIT % 28.7*   PLATELETS 10*3/mm3 275   GLUCOSE mg/dL 154*   CREATININE mg/dL 9.10*   BUN mg/dL 59*   SODIUM mmol/L 145   POTASSIUM mmol/L 4.2   ANION GAP mmol/L 15.2*       XR Abdomen 2+ VW with Chest 1 VW  Result Date: 4/22/2025  Impression: 1. Large volume colonic stool without obstruction, in keeping with constipation. 2. Cardiomegaly with findings suggesting interstitial pulmonary edema. Superimposed infectious airspace process including atypical etiologies and aspiration possible in the right clinical setting. Electronically Signed: Epifanio Epps MD  4/22/2025 2:12 PM EDT  Workstation ID: [id number]        I reviewed the patient's new clinical results.    Assessment/Plan:     Active and Resolved Problems  Active Hospital Problems    Diagnosis  POA    **Acute hypoxic respiratory failure [J96.01]  Yes      Resolved Hospital Problems   No resolved problems to display.       AHRF  Fluid overload  ESRD on PD  - Hypoxic to 86% on RA, not on O2 at home. Likely 2/2 fluid overload. CXR with pulmonary edema, no significant effusion noted. Possible superimposed airspace disease on read, pt afebrile with wbc wnl, does not have any infectious symptoms. RVP negative. Defer abx at this time.   - CHF as below  - Nephrology consulted in ED, appreciate assistance. Per ED planning for additional PD for fluid removal  - Diuresis  - trend labs, renally dose medications  - cont pulse ox, wean O2 as able  - PD dialysis today      CHF  Elevated troponin  - Troponin 113 -> 113, BNP 95820, ESRD as above. ECG with sinus tach, possible TWI V1-V3, no previous ecg. Pt without chest pain/pressure. Has Hx CHF. CXR with cardiomegaly and congestion as above  - TTE 1/26/24 Ki EF 64%, no significant valvular disease noted.  - Diuresis  - Cont home medications  - Echo: 58% LVEF.  Mild concentric hypertrophy.  Grade 1 impaired relaxation.  Moderately elevated RVSP.  LV strain  -14.7  - Heart failure navigator consulted, appreciate assistance.   - Telemetry  - daily weight, I/O     Chronic pain  Constipation  - Pt on home opioid for neuropathy/arthritis  - Abd XR with large stool burden, no signs of obstruction  - scheduled bowel regimen, movantik. May need enema if not sufficient     T2DM  - A1C pending  - Per chart review, previously on lantus 20 QHS and aspart 8units TIDAC. Pt states he takes insulin but has more recently been taking 30 units every other day and short acting insulin when he feels his sugar is low, states he changed his diet.   - Will start lantus 15 qhs and SSI, titrate. Diabetic educator consulted, appreciate assistance     Htn  - BP elevated OA, pt w/o chest pain/headache/vision change. Reports medication compliance though has not been taking other medications as prescribed   - Resume home medications once verified, titrate as needd  -   Anemia of chronic disease  - Hgb 8.1 OA, stable from previous. Pt w/o bleeding signs/Sx        Patient seen and evaluated at bedside.  Patient states that he does feel better, and has had markedly decreased edema.  Patient continues with elevated blood pressures, latest blood pressure 186/101.  Resume losartan 100 mg daily, hydralazine 100 mg 3 times daily, started on amlodipine 10 mg daily, and resume Coreg 25 mg twice daily.  Consider adding clonidine if blood pressures continue to be elevated post PD    VTE Prophylaxis:  Pharmacologic VTE prophylaxis orders are present.         Disposition  Planning:     Barriers to Discharge: Hemodynamic stability, dialysis, clinical process  Anticipated Date of Discharge: 4/25/2025  Place of Discharge: Home      Time: 30 minutes     Code Status and Medical Interventions: CPR (Attempt to Resuscitate); Full Support   Ordered at: 04/22/25 1548     Code Status (Patient has no pulse and is not breathing):    CPR (Attempt to Resuscitate)     Medical Interventions (Patient has pulse or is breathing):    Full Support     Level Of Support Discussed With:    Patient       Signature: Electronically signed by LUCINDA King, 04/23/25, 12:59 EDT.  [facility] Hospitalist Team

## 2025-04-23 NOTE — PLAN OF CARE
Goal Outcome Evaluation:              Outcome Evaluation: Pt. A&Ox4, makes needs known. Pt. started on PD today, tolerated well. pt. has been hypertensive throughout the day, MD is aware. Pt. BP has slightly improved since PD.

## 2025-04-23 NOTE — PHARMACY PATIENT ASSISTANCE
Transitions of Care (test claim):    Drug Naloxegol:  25 mg PO daily  Covered/PA Required: PA needed  Copay Per Month: N/A  Is the medication eligible for a trial card/copay card? No        Please note that when it states medication is eligible for a trail card that this only means that the medication has a free trial available. This does not assess if the patient has already utilized the once in a lifetime free trial.     This test claim coverage is only valid on the date the note is published. Copay/coverage could vary depending on patient coverage at a later date.  Additionally this test claim is for the sole purpose of a price check not a clinical recommendation.     For billing questions please call [facility] Pharmacist at ext: [phone]  For M2B questions please call [facility] at ext: [phone]      Tye Segura PharmD, BCPS

## 2025-04-23 NOTE — PHARMACY PATIENT ASSISTANCE
Transitions of Care (test claim):    Drug Linaclotide:   72 mcg PO daily  Covered/PA Required: Covered without PA  Copay Per Month: $0  Is the medication eligible for a trial card/copay card? N/A        Please note that when it states medication is eligible for a trail card that this only means that the medication has a free trial available. This does not assess if the patient has already utilized the once in a lifetime free trial.     This test claim coverage is only valid on the date the note is published. Copay/coverage could vary depending on patient coverage at a later date.  Additionally this test claim is for the sole purpose of a price check not a clinical recommendation.     For billing questions please call [facility] Pharmacist at ext: [phone]  For M2B questions please call [facility] at ext: [phone]      Tye Segura PharmD, BCPS

## 2025-04-23 NOTE — CASE MANAGEMENT/SOCIAL WORK
Continued Stay Note  VENKATESH Dockery     Patient Name: Brandon Stephen  MRN: [id number]  Today's Date: 4/23/2025    Admit Date: 4/22/2025    Plan: From Home; Watch for [facility] O2   Discharge Plan       Row Name 04/23/25 1252       Plan    Plan Comments DC barriers: constipation, PD today, IV bumex, UDS pending, ECHO today. Nephrology, HF navigator and DM educator following.                     Aylin Lawton RN     Office phone: [phone]  Office fax: [phone]

## 2025-04-24 VITALS
BODY MASS INDEX: 28.62 KG/M2 | SYSTOLIC BLOOD PRESSURE: 186 MMHG | OXYGEN SATURATION: 93 % | DIASTOLIC BLOOD PRESSURE: 105 MMHG | HEART RATE: 95 BPM | HEIGHT: 74 IN | WEIGHT: 223 LBS | TEMPERATURE: 98.1 F | RESPIRATION RATE: 16 BRPM

## 2025-04-24 LAB
ANION GAP SERPL CALCULATED.3IONS-SCNC: 13.6 MMOL/L (ref 5–15)
BUN SERPL-MCNC: 55 MG/DL (ref 6–20)
BUN/CREAT SERPL: 6.5 (ref 7–25)
CALCIUM SPEC-SCNC: 7.5 MG/DL (ref 8.6–10.5)
CHLORIDE SERPL-SCNC: 104 MMOL/L (ref 98–107)
CO2 SERPL-SCNC: 24.4 MMOL/L (ref 22–29)
CREAT SERPL-MCNC: 8.52 MG/DL (ref 0.76–1.27)
DEPRECATED RDW RBC AUTO: 64.2 FL (ref 37–54)
EGFRCR SERPLBLD CKD-EPI 2021: 6.8 ML/MIN/1.73
ERYTHROCYTE [DISTWIDTH] IN BLOOD BY AUTOMATED COUNT: 17.6 % (ref 12.3–15.4)
FERRITIN SERPL-MCNC: 1033 NG/ML (ref 30–400)
GLUCOSE BLDC GLUCOMTR-MCNC: 153 MG/DL (ref 70–105)
GLUCOSE BLDC GLUCOMTR-MCNC: 238 MG/DL (ref 70–105)
GLUCOSE SERPL-MCNC: 148 MG/DL (ref 65–99)
HCT VFR BLD AUTO: 28.7 % (ref 37.5–51)
HGB BLD-MCNC: 8.4 G/DL (ref 13–17.7)
IRON 24H UR-MRATE: 52 MCG/DL (ref 59–158)
IRON SATN MFR SERPL: 29 % (ref 20–50)
MCH RBC QN AUTO: 29.2 PG (ref 26.6–33)
MCHC RBC AUTO-ENTMCNC: 29.3 G/DL (ref 31.5–35.7)
MCV RBC AUTO: 99.7 FL (ref 79–97)
PHOSPHATE SERPL-MCNC: 4.9 MG/DL (ref 2.5–4.5)
PLATELET # BLD AUTO: 290 10*3/MM3 (ref 140–450)
PMV BLD AUTO: 10.7 FL (ref 6–12)
POTASSIUM SERPL-SCNC: 4.7 MMOL/L (ref 3.5–5.2)
RBC # BLD AUTO: 2.88 10*6/MM3 (ref 4.14–5.8)
SODIUM SERPL-SCNC: 142 MMOL/L (ref 136–145)
TIBC SERPL-MCNC: 180 MCG/DL (ref 298–536)
TRANSFERRIN SERPL-MCNC: 121 MG/DL (ref 200–360)
WBC NRBC COR # BLD AUTO: 6.56 10*3/MM3 (ref 3.4–10.8)

## 2025-04-24 PROCEDURE — 82948 REAGENT STRIP/BLOOD GLUCOSE: CPT | Performed by: STUDENT IN AN ORGANIZED HEALTH CARE EDUCATION/TRAINING PROGRAM

## 2025-04-24 PROCEDURE — G0257 UNSCHED DIALYSIS ESRD PT HOS: HCPCS

## 2025-04-24 PROCEDURE — 83540 ASSAY OF IRON: CPT

## 2025-04-24 PROCEDURE — G0378 HOSPITAL OBSERVATION PER HR: HCPCS

## 2025-04-24 PROCEDURE — 96376 TX/PRO/DX INJ SAME DRUG ADON: CPT

## 2025-04-24 PROCEDURE — 25010000002 BUMETANIDE PER 0.5 MG: Performed by: STUDENT IN AN ORGANIZED HEALTH CARE EDUCATION/TRAINING PROGRAM

## 2025-04-24 PROCEDURE — 63710000001 INSULIN LISPRO (HUMAN) PER 5 UNITS: Performed by: STUDENT IN AN ORGANIZED HEALTH CARE EDUCATION/TRAINING PROGRAM

## 2025-04-24 PROCEDURE — 82728 ASSAY OF FERRITIN: CPT

## 2025-04-24 PROCEDURE — 84100 ASSAY OF PHOSPHORUS: CPT

## 2025-04-24 PROCEDURE — 80048 BASIC METABOLIC PNL TOTAL CA: CPT

## 2025-04-24 PROCEDURE — 85027 COMPLETE CBC AUTOMATED: CPT

## 2025-04-24 PROCEDURE — 84466 ASSAY OF TRANSFERRIN: CPT

## 2025-04-24 RX ORDER — BUMETANIDE 2 MG/1
2 TABLET ORAL DAILY
Qty: 30 TABLET | Refills: 0 | Status: SHIPPED | OUTPATIENT
Start: 2025-04-24 | End: 2025-05-24

## 2025-04-24 RX ORDER — AMLODIPINE BESYLATE 10 MG/1
10 TABLET ORAL
Qty: 30 TABLET | Refills: 0 | Status: SHIPPED | OUTPATIENT
Start: 2025-04-25 | End: 2025-05-25

## 2025-04-24 RX ORDER — BLOOD SUGAR DIAGNOSTIC
1 STRIP MISCELLANEOUS
Qty: 100 EACH | Refills: 2 | Status: SHIPPED | OUTPATIENT
Start: 2025-04-24

## 2025-04-24 RX ORDER — SEVELAMER CARBONATE 800 MG/1
1600 TABLET, FILM COATED ORAL
Qty: 180 TABLET | Refills: 0 | Status: SHIPPED | OUTPATIENT
Start: 2025-04-24 | End: 2025-05-24

## 2025-04-24 RX ORDER — POLYETHYLENE GLYCOL 3350 17 G/17G
17 POWDER, FOR SOLUTION ORAL DAILY
Qty: 30 PACKET | Refills: 0 | Status: SHIPPED | OUTPATIENT
Start: 2025-04-24 | End: 2025-05-24

## 2025-04-24 RX ORDER — CALCITRIOL 0.25 UG/1
0.25 CAPSULE, LIQUID FILLED ORAL DAILY
Qty: 30 CAPSULE | Refills: 0 | Status: SHIPPED | OUTPATIENT
Start: 2025-04-25 | End: 2025-05-25

## 2025-04-24 RX ORDER — BLOOD-GLUCOSE METER
1 EACH MISCELLANEOUS
Qty: 1 KIT | Refills: 0 | Status: SHIPPED | OUTPATIENT
Start: 2025-04-24

## 2025-04-24 RX ORDER — CARVEDILOL 25 MG/1
25 TABLET ORAL 2 TIMES DAILY WITH MEALS
Qty: 60 TABLET | Refills: 0 | Status: SHIPPED | OUTPATIENT
Start: 2025-04-24 | End: 2025-05-24

## 2025-04-24 RX ORDER — LANCETS
1 EACH MISCELLANEOUS
Qty: 100 EACH | Refills: 2 | Status: SHIPPED | OUTPATIENT
Start: 2025-04-24

## 2025-04-24 RX ORDER — LOSARTAN POTASSIUM 100 MG/1
100 TABLET ORAL
Qty: 30 TABLET | Refills: 0 | Status: SHIPPED | OUTPATIENT
Start: 2025-04-25 | End: 2025-05-25

## 2025-04-24 RX ADMIN — CALCITRIOL 0.25 MCG: 0.25 CAPSULE ORAL at 09:02

## 2025-04-24 RX ADMIN — CARVEDILOL 25 MG: 25 TABLET, FILM COATED ORAL at 09:01

## 2025-04-24 RX ADMIN — AMLODIPINE BESYLATE 10 MG: 5 TABLET ORAL at 09:02

## 2025-04-24 RX ADMIN — BUMETANIDE 1 MG: 0.25 INJECTION INTRAMUSCULAR; INTRAVENOUS at 05:48

## 2025-04-24 RX ADMIN — HYDRALAZINE HYDROCHLORIDE 100 MG: 25 TABLET ORAL at 09:02

## 2025-04-24 RX ADMIN — Medication 10 ML: at 09:01

## 2025-04-24 RX ADMIN — OXYCODONE 10 MG: 5 TABLET ORAL at 03:51

## 2025-04-24 RX ADMIN — SODIUM CHLORIDE, SODIUM LACTATE, CALCIUM CHLORIDE, MAGNESIUM CHLORIDE AND DEXTROSE 2000 ML: 2.5; 538; 448; 25.7; 5.08 INJECTION, SOLUTION INTRAPERITONEAL at 10:23

## 2025-04-24 RX ADMIN — SEVELAMER CARBONATE 1600 MG: 800 TABLET, FILM COATED ORAL at 09:02

## 2025-04-24 RX ADMIN — LOSARTAN POTASSIUM 100 MG: 50 TABLET, FILM COATED ORAL at 09:02

## 2025-04-24 RX ADMIN — SEVELAMER CARBONATE 1600 MG: 800 TABLET, FILM COATED ORAL at 11:57

## 2025-04-24 RX ADMIN — ASPIRIN 325 MG ORAL TABLET 325 MG: 325 PILL ORAL at 09:02

## 2025-04-24 RX ADMIN — INSULIN LISPRO 4 UNITS: 100 INJECTION, SOLUTION INTRAVENOUS; SUBCUTANEOUS at 11:57

## 2025-04-24 NOTE — PLAN OF CARE
Goal Outcome Evaluation:      Patient on peritoneal dialysis. Remains on 2.5L NC. Patient stable at this time.

## 2025-04-24 NOTE — DISCHARGE SUMMARY
"[facility] Medicine Services  Discharge Summary    Date of Service: 2025  Patient Name: Brandon Stephen  : 1970  MRN: [id number]    Date of Admission: 2025  Discharge Diagnosis: Acute hypoxic respiratory failure  Date of Discharge: 2025  Primary Care Physician: Eusebia Poon APRN      Presenting Problem:   Dyspnea on exertion [R06.09]  Hypoxia [R09.02]  Acute hypoxic respiratory failure [J96.01]    Active and Resolved Hospital Problems:  Active Hospital Problems    Diagnosis POA    **Acute hypoxic respiratory failure [J96.01] Yes      Resolved Hospital Problems   No resolved problems to display.         Hospital Course     HPI:    \"Brandon Stephen is a 54 y.o. male with a CMH of ESRD on PD, T2DM, ACD, CHF, htn, chronic pain who presented to [facility] on 2025 with constipation and SOA. Pt initially presented with complaint of constipation, reports he has not had a bowel movement in 4 days. Denies abd pain but states it is uncomfortable. He is on opioids for chronic pain and has had similar constipation previously. In ED he was found to be hypoxic to 86%, started on 2L O2 with improvement. Noted to be fluid overloaded on exam and pt did report SOA/RINCON for the past 3 days or so with incr LE edema. Denies any chest pain/pressure/fevers/chills/sore throat. He states this happens intermittently where he gets fluid overloaded. He has a diuretic at home but does not use it regularly, states \"I guess I was too late this time\". He does peritoneal dialysis at home, does still make urine, denies any issues with his PD. Otherwise reports compliance with his medications. Nephrology consulted in ED for assistance with dialysis/volume overload.        Review of Systems   Constitutional:  Negative for activity change, appetite change, chills and fever.   HENT:  Negative for congestion, rhinorrhea and sore throat.    Eyes: Negative.    Respiratory:  Positive for shortness of " "breath. Negative for cough and wheezing.    Cardiovascular:  Positive for leg swelling. Negative for chest pain and palpitations.   Gastrointestinal:  Positive for constipation. Negative for abdominal distention, abdominal pain, blood in stool, diarrhea, nausea and vomiting.   Genitourinary:  Negative for dysuria, frequency, hematuria and urgency.   Musculoskeletal:  Positive for arthralgias. Negative for myalgias.        S/p R AKA   Neurological:  Negative for dizziness, syncope, light-headedness and headaches. \"    Hospital Course:  Patient hypertensive in the 200s over 100s, patient received Bumex IV, which improved output, shortness of breath resolved patient was weaned down to 1 L nasal cannula.  Nephrology was consulted.  Patient underwent PD COPD with 2.5% solution, 4 exchanges, 2L per exchange. And then overnight dwell every night for 12 hours with icodextrin along with resuming home medications, blood pressures improved to 150s over 70s.  Patient does state that his blood pressures are typically elevated in the morning time, especially prior to PD.  Bowel regimen given for patient with history of constipation, patient had 2 BMs while inpatient.  Patient was originally on Movantik for OIC, however due to insurance has been unable to obtain.  Will recommend patient to take MiraLAX daily.  Patient was requiring oxygen during inpatient stay, walk test was obtained prior to discharge, patient was able to maintain sats saturations without any oxygenation.  Encouraged compliance with home medications, also started on Bumex.  Patient is hemodynamically stable, and ready for discharge.  Plan is for patient to be discharged home to continue current dialysis regimen.          DISCHARGE Follow Up Recommendations for labs and diagnostics: n/a        Day of Discharge     Vital Signs:  Temp:  [97.6 °F (36.4 °C)-98.3 °F (36.8 °C)] 98.1 °F (36.7 °C)  Heart Rate:  [] 89  Resp:  [12-20] 13  BP: (137-186)/() " 159/93  Flow (L/min) (Oxygen Therapy):  [2.5] 2.5    Physical Exam:  Physical Exam  Constitutional:       General: He is not in acute distress.     Appearance: Normal appearance.   HENT:      Head: Normocephalic and atraumatic.      Mouth/Throat:      Mouth: Mucous membranes are moist.      Pharynx: Oropharynx is clear.   Eyes:      Extraocular Movements: Extraocular movements intact.      Conjunctiva/sclera: Conjunctivae normal.   Cardiovascular:      Rate and Rhythm: Regular rhythm. Tachycardia present.      Heart sounds: Normal heart sounds.   Pulmonary:      Effort: Pulmonary effort is normal.      Breath sounds: No wheezing.      Comments:      Abdominal:      General: Abdomen is flat. Bowel sounds are normal.      Palpations: Abdomen is soft.      Tenderness: There is no abdominal tenderness. There is no guarding or rebound.      Comments: PD catheter in place with dressing, no surrounding erythema/induration/drainage noted   Musculoskeletal:         General: No swelling or tenderness.      Cervical back: Normal range of motion and neck supple.      Right lower leg: Edema present.      Left lower leg: Edema present.      Comments: S/p R AKA, L metatarsal amputation   Skin:     General: Skin is warm and dry.  Dry cracking is noted to left lower extremity.  Neurological:      General: No focal deficit present.      Mental Status: He is alert and oriented to person, place, and time. Mental status is at baseline.       Pertinent  and/or Most Recent Results     LAB RESULTS:      Lab 04/24/25 0434 04/23/25  0353 04/22/25  1318   WBC 6.56 6.63 6.25   HEMOGLOBIN 8.4* 8.6* 8.1*   HEMATOCRIT 28.7* 28.7* 26.6*   PLATELETS 290 275 230   NEUTROS ABS  --  4.20 3.58   IMMATURE GRANS (ABS)  --  0.04 0.09*   LYMPHS ABS  --  1.34 1.55   MONOS ABS  --  0.57 0.57   EOS ABS  --  0.43* 0.42*   MCV 99.7* 99.7* 99.6*         Lab 04/24/25  0434 04/23/25  0353 04/22/25  1318   SODIUM 142 145 143   POTASSIUM 4.7 4.2 4.5   CHLORIDE 104  106 106   CO2 24.4 23.8 21.6*   ANION GAP 13.6 15.2* 15.4*   BUN 55* 59* 56*   CREATININE 8.52* 9.10* 8.92*   EGFR 6.8* 6.3* 6.5*   GLUCOSE 148* 154* 215*   CALCIUM 7.5* 7.4* 7.2*   MAGNESIUM  --  2.1  --    PHOSPHORUS 4.9* 5.4*  --    HEMOGLOBIN A1C  --   --  5.56             Lab 04/22/25  1418 04/22/25  1318   PROBNP  --  28,839.0*   HSTROP T 113* 113*             Lab 04/24/25  0434   IRON 52*   IRON SATURATION (TSAT) 29   TIBC 180*   TRANSFERRIN 121*   FERRITIN 1,033.00*         Brief Urine Lab Results       None          Microbiology Results (last 10 days)       Procedure Component Value - Date/Time    Respiratory Panel PCR w/COVID-19(SARS-CoV-2) ILIANA/LEONARDO/MYRNA/PAD/COR/LAYA In-House, NP Swab in UTM/VTM, 2 HR TAT - Swab, Nasopharynx [[id number]]  (Normal) Collected: 04/22/25 1319    Lab Status: Final result Specimen: Swab from Nasopharynx Updated: 04/22/25 1411     ADENOVIRUS, PCR Not Detected     Coronavirus 229E Not Detected     Coronavirus HKU1 Not Detected     Coronavirus NL63 Not Detected     Coronavirus OC43 Not Detected     COVID19 Not Detected     Human Metapneumovirus Not Detected     Human Rhinovirus/Enterovirus Not Detected     Influenza A PCR Not Detected     Influenza B PCR Not Detected     Parainfluenza Virus 1 Not Detected     Parainfluenza Virus 2 Not Detected     Parainfluenza Virus 3 Not Detected     Parainfluenza Virus 4 Not Detected     RSV, PCR Not Detected     Bordetella pertussis pcr Not Detected     Bordetella parapertussis PCR Not Detected     Chlamydophila pneumoniae PCR Not Detected     Mycoplasma pneumo by PCR Not Detected    Narrative:      In the setting of a positive respiratory panel with a viral infection PLUS a negative procalcitonin without other underlying concern for bacterial infection, consider observing off antibiotics or discontinuation of antibiotics and continue supportive care. If the respiratory panel is positive for atypical bacterial infection (Bordetella pertussis,  Chlamydophila pneumoniae, or Mycoplasma pneumoniae), consider antibiotic de-escalation to target atypical bacterial infection.            XR Abdomen 2+ VW with Chest 1 VW  Result Date: 4/22/2025  Impression: Impression: 1. Large volume colonic stool without obstruction, in keeping with constipation. 2. Cardiomegaly with findings suggesting interstitial pulmonary edema. Superimposed infectious airspace process including atypical etiologies and aspiration possible in the right clinical setting. Electronically Signed: Epifanio Epps MD  4/22/2025 2:12 PM EDT  Workstation ID: [id number]              Results for orders placed during the hospital encounter of 04/22/25    Adult Transthoracic Echo Complete W/ Cont if Necessary Per Protocol    Interpretation Summary    Left ventricular systolic function is normal. Calculated left ventricular EF = 58% Left ventricular ejection fraction appears to be 56 - 60%.    Left ventricular wall thickness is consistent with mild concentric hypertrophy.    Left ventricular diastolic function is consistent with (grade I) impaired relaxation.    The right ventricular cavity is mildly dilated.    Estimated right ventricular systolic pressure from tricuspid regurgitation is moderately elevated (45-55 mmHg).    Moderate pulmonary hypertension is present.    Global longitudinal LV strain (GLS) = -14.7%      Labs Pending at Discharge:  Pending Results       None            Procedures Performed           Consults:   Consults       Date and Time Order Name Status Description    4/22/2025  3:04 PM Nephrology (on -call MD unless specified) Completed     4/22/2025  3:04 PM Hospitalist (on-call MD unless specified)                Discharge Details        Discharge Medications        ASK your doctor about these medications        Instructions Start Date   aspirin 325 MG tablet   325 mg, Daily      cinacalcet 60 MG tablet  Commonly known as: SENSIPAR   60 mg, Every Other Day      cyclobenzaprine 10 MG  tablet  Commonly known as: FLEXERIL   10 mg, Daily PRN      HumaLOG KwikPen 100 UNIT/ML solution pen-injector  Generic drug: Insulin Lispro (1 Unit Dial)   Inject 1-2 Units into the appropriate muscle as directed by prescriber 3 (Three) Times a Day With Meals.      hydrALAZINE 100 MG tablet  Commonly known as: APRESOLINE   100 mg, 3 Times Daily      Lantus SoloStar 100 UNIT/ML injection pen  Generic drug: Insulin Glargine   Inject 20 Units under the skin into the appropriate area as directed Daily.      lidocaine 5 %  Commonly known as: LIDODERM   1 patch, Every 12 Hours      oxyCODONE HCl 10 MG tablet   Ask about: Which instructions should I use?   1 tablet, 3 Times Daily PRN               No Known Allergies      Discharge Disposition: Home      Diet:  Hospital:  Diet Order   Procedures    Diet: Renal, Diabetic; Consistent Carbohydrate; Low Sodium (2-3g), Low Potassium, Low Phosphorus; Fluid Consistency: Thin (IDDSI 0)         Discharge Activity:         CODE STATUS:  Code Status and Medical Interventions: CPR (Attempt to Resuscitate); Full Support   Ordered at: 04/22/25 1548     Code Status (Patient has no pulse and is not breathing):    CPR (Attempt to Resuscitate)     Medical Interventions (Patient has pulse or is breathing):    Full Support     Level Of Support Discussed With:    Patient         No future appointments.        Time spent on Discharge including face to face service:  30 minutes    Signature: Electronically signed by LUCINDA King, 04/24/25, 10:59 EDT.  [facility] Hospitalist Team

## 2025-04-24 NOTE — PLAN OF CARE
Goal Outcome Evaluation:              Outcome Evaluation: Pt. A&Ox4, makes needs known. Pt. discharging home.

## 2025-04-24 NOTE — PROCEDURES
Exercise Oximetry    Patient Name:Brandon Stephen   MRN: [id number]   Date: 04/24/25             ROOM AIR BASELINE   SpO2% 96   Heart Rate    Blood Pressure      EXERCISE ON ROOM AIR SpO2% EXERCISE ON O2 @  LPM SpO2%   1 MINUTE 95 1 MINUTE    2 MINUTES 92 2 MINUTES    3 MINUTES 90 3 MINUTES    4 MINUTES 91 4 MINUTES    5 MINUTES 90 5 MINUTES    6 MINUTES 92 6 MINUTES               Distance Walked   Distance Walked   Dyspnea (Layla Scale)   Dyspnea (Layla Scale)   Fatigue (Layla Scale)   Fatigue (Layla Scale)   SpO2% Post Exercise   SpO2% Post Exercise   HR Post Exercise   HR Post Exercise   Time to Recovery   Time to Recovery     Comments: walked pt and pts sats stayed @ or above 90% while ambulating.  Pt does not need home o2 @ this time.

## 2025-04-24 NOTE — PROGRESS NOTES
Patient Name: Brandon Stephen  : 1970  MRN: [id number]  Primary Care Physician: Eusebia Poon APRN  Date of admission: 2025    Patient Care Team:  Eusebia Poon APRN as PCP - General (Nurse Practitioner)        Subjective   Subjective:     Patient seen and examined   Still with uncontrolled BP  NAD noted    Review of systems:  negative      Allergies:  No Known Allergies    Objective   Exam:     Vital Signs  Temp:  [97.6 °F (36.4 °C)-98.3 °F (36.8 °C)] 97.9 °F (36.6 °C)  Heart Rate:  [] 87  Resp:  [12-20] 16  BP: (137-186)/() 177/101  SpO2:  [94 %-100 %] 98 %  on  Flow (L/min) (Oxygen Therapy):  [2.5] 2.5;   Device (Oxygen Therapy): nasal cannula  Body mass index is 28.63 kg/m².    General:  male in no acute distress.    Head:      Normocephalic and atraumatic.    Eyes:      PERRL/EOM intact, conjunctivae and sclerae clear without nystagmus.    Neck:      No masses, thyromegaly,  trachea central with normal respiratory effort   Lungs:    Clear bilaterally to auscultation.    Heart:      Regular rate and rhythm, no murmur no gallop  Abd:        Soft, nontender, not distended, bowel sounds positive, no shifting dullness   Pulses:   Pulses palpable  Extr:        No cyanosis or clubbing--+edema.    Neuro:    No focal deficits.   alert oriented x3  Skin:       Intact without lesions or rashes.    Psych:    Alert and cooperative; normal mood and affect; .      Results Review:  I have personally reviewed most recent Data :  CBC    Results from last 7 days   Lab Units 25  0434 25  0353 25  1318   WBC 10*3/mm3 6.56 6.63 6.25   HEMOGLOBIN g/dL 8.4* 8.6* 8.1*   PLATELETS 10*3/mm3 290 275 230     CMP   Results from last 7 days   Lab Units 25  0434 25  0353 25  1318   SODIUM mmol/L 142 145 143   POTASSIUM mmol/L 4.7 4.2 4.5   CHLORIDE mmol/L 104 106 106   CO2 mmol/L 24.4 23.8 21.6*   BUN mg/dL 55* 59* 56*   CREATININE mg/dL 8.52* 9.10* 8.92*   GLUCOSE  mg/dL 148* 154* 215*     ABG      XR Abdomen 2+ VW with Chest 1 VW  Result Date: 4/22/2025  Impression: 1. Large volume colonic stool without obstruction, in keeping with constipation. 2. Cardiomegaly with findings suggesting interstitial pulmonary edema. Superimposed infectious airspace process including atypical etiologies and aspiration possible in the right clinical setting. Electronically Signed: Epifanio Epps MD  4/22/2025 2:12 PM EDT  Workstation ID: [id number]      Results for orders placed during the hospital encounter of 04/22/25    Adult Transthoracic Echo Complete W/ Cont if Necessary Per Protocol    Interpretation Summary    Left ventricular systolic function is normal. Calculated left ventricular EF = 58% Left ventricular ejection fraction appears to be 56 - 60%.    Left ventricular wall thickness is consistent with mild concentric hypertrophy.    Left ventricular diastolic function is consistent with (grade I) impaired relaxation.    The right ventricular cavity is mildly dilated.    Estimated right ventricular systolic pressure from tricuspid regurgitation is moderately elevated (45-55 mmHg).    Moderate pulmonary hypertension is present.    Global longitudinal LV strain (GLS) = -14.7%    Scheduled Meds:amLODIPine, 10 mg, Oral, Q24H  aspirin, 325 mg, Oral, Daily  senna-docusate sodium, 2 tablet, Oral, BID   And  polyethylene glycol, 17 g, Oral, Daily   And  bisacodyl, 5 mg, Oral, Daily  bumetanide, 1 mg, Intravenous, Q12H  calcitriol, 0.25 mcg, Oral, Daily  carvedilol, 25 mg, Oral, BID With Meals  cinacalcet, 60 mg, Oral, Every Other Day  cloNIDine, 0.1 mg, Oral, Once  Extraneal (soto #[id number]), 2,000 mL, Intraperitoneal, Daily  heparin (porcine), 5,000 Units, Subcutaneous, Q8H  hydrALAZINE, 100 mg, Oral, TID  insulin glargine, 15 Units, Subcutaneous, Nightly  insulin lispro, 2-9 Units, Subcutaneous, 4x Daily AC & at Bedtime  losartan, 100 mg, Oral, Q24H  Naloxegol Oxalate, 25 mg, Oral,  QAM  sevelamer, 1,600 mg, Oral, TID With Meals  UltraBag/Dianeal PD-2/2.5% Dex (soto #[id number]), 2,000 mL, Intraperitoneal, 4 Exchanges Daily - Dwell Overnight      Continuous Infusions:   PRN Meds:  acetaminophen **OR** acetaminophen **OR** acetaminophen    senna-docusate sodium **AND** polyethylene glycol **AND** bisacodyl **AND** bisacodyl    cyclobenzaprine    dextrose    dextrose    glucagon (human recombinant)    hydrALAZINE    naloxone    nitroglycerin    oxyCODONE    [COMPLETED] Insert Peripheral IV **AND** sodium chloride    Assessment & Plan   Assessment and Plan:         Acute hypoxic respiratory failure    ASSESSMENT:  ESRD on PD   HTN  Volume overload  Constipation   DM  Anemia, chronic   CHF     ECHO 4/23/25: EF 56-60%, mild LVH, and G1DD       PLAN :      Patient with ESRD on PD, will run script as follows: COPD with 2.5% solution, 4 exchanges, 2L per exchange. And then overnight dwell every night for 12 hours with icodextrin  Volume, expanded with some SOA, PD as above and continue Bumex 1mg IV q12. ECHO as above  Blood pressure, remains uncontrolled, resumed home meds including Losartan 100 mg daily, Hydralazine 100 mg TID, started Amlodipine 10 mg daily (at home on Nifedipine). Also resumed his home Coreg at 25mg BID.   Constipation, s/p Miralax and Dulcolax, documented stool occurrence X3. Need to prevent constipation   Continue Renvela and Calcitriol Home medications show Calcitriol, will restart as calcium level is low at 7.5  Remaining electrolytes/acid base stable  Anemia, hgb stable but monitor, iron stores reviewed, ferritin elevated  Continue to check labs daily              Electronically signed by LUCINDA Rock [place] [facility] consultant  [phone]  4/24/2025  10:05 EDT

## 2025-04-25 ENCOUNTER — TELEPHONE (OUTPATIENT)
Dept: DIABETES SERVICES | Facility: HOSPITAL | Age: 55
End: 2025-04-25
Payer: MEDICAID

## 2025-04-25 NOTE — CASE MANAGEMENT/SOCIAL WORK
Case Management Discharge Note      Final Note: Routine home    Provided Post Acute Provider List?: N/A  Provided Post Acute Provider Quality & Resource List?: N/A    Selected Continued Care - Discharged on 4/24/2025 Admission date: 4/22/2025 - Discharge disposition: Home or Self Care         Transportation Services  Private: Car    Final Discharge Disposition Code: 01 - home or self-care

## 2025-04-28 ENCOUNTER — TELEPHONE (OUTPATIENT)
Dept: DIABETES SERVICES | Facility: HOSPITAL | Age: 55
End: 2025-04-28
Payer: MEDICAID